# Patient Record
Sex: MALE | Race: WHITE | Employment: FULL TIME | URBAN - METROPOLITAN AREA
[De-identification: names, ages, dates, MRNs, and addresses within clinical notes are randomized per-mention and may not be internally consistent; named-entity substitution may affect disease eponyms.]

---

## 2017-08-31 ENCOUNTER — APPOINTMENT (OUTPATIENT)
Dept: GENERAL RADIOLOGY | Age: 62
End: 2017-08-31
Attending: EMERGENCY MEDICINE
Payer: OTHER GOVERNMENT

## 2017-08-31 ENCOUNTER — APPOINTMENT (OUTPATIENT)
Dept: CT IMAGING | Age: 62
End: 2017-08-31
Attending: EMERGENCY MEDICINE
Payer: OTHER GOVERNMENT

## 2017-08-31 ENCOUNTER — HOSPITAL ENCOUNTER (EMERGENCY)
Age: 62
Discharge: HOME | End: 2017-08-31
Attending: EMERGENCY MEDICINE
Payer: OTHER GOVERNMENT

## 2017-08-31 VITALS
TEMPERATURE: 98.1 F | RESPIRATION RATE: 16 BRPM | HEART RATE: 68 BPM | DIASTOLIC BLOOD PRESSURE: 69 MMHG | OXYGEN SATURATION: 97 % | SYSTOLIC BLOOD PRESSURE: 136 MMHG

## 2017-08-31 DIAGNOSIS — I95.1 ORTHOSTATIC HYPOTENSION: Primary | ICD-10-CM

## 2017-08-31 DIAGNOSIS — R55 SYNCOPE AND COLLAPSE: ICD-10-CM

## 2017-08-31 DIAGNOSIS — D69.1 THROMBOCYTOPATHIA (HCC): ICD-10-CM

## 2017-08-31 DIAGNOSIS — K75.9 HEPATITIS: ICD-10-CM

## 2017-08-31 DIAGNOSIS — F10.10 ALCOHOL ABUSE: ICD-10-CM

## 2017-08-31 LAB
ALBUMIN SERPL-MCNC: 2.8 G/DL (ref 3.4–5)
ALBUMIN/GLOB SERPL: 0.6 {RATIO} (ref 0.8–1.7)
ALP SERPL-CCNC: 132 U/L (ref 45–117)
ALT SERPL-CCNC: 98 U/L (ref 16–61)
AMMONIA PLAS-SCNC: 82 UMOL/L (ref 11–32)
ANION GAP SERPL CALC-SCNC: 10 MMOL/L (ref 3–18)
AST SERPL-CCNC: 272 U/L (ref 15–37)
ATRIAL RATE: 63 BPM
BASOPHILS # BLD: 0.1 K/UL (ref 0–0.1)
BASOPHILS NFR BLD: 2 % (ref 0–2)
BILIRUB SERPL-MCNC: 7.5 MG/DL (ref 0.2–1)
BUN SERPL-MCNC: 8 MG/DL (ref 7–18)
BUN/CREAT SERPL: 10 (ref 12–20)
CALCIUM SERPL-MCNC: 8.4 MG/DL (ref 8.5–10.1)
CALCULATED P AXIS, ECG09: 48 DEGREES
CALCULATED R AXIS, ECG10: -17 DEGREES
CALCULATED T AXIS, ECG11: -7 DEGREES
CHLORIDE SERPL-SCNC: 102 MMOL/L (ref 100–108)
CO2 SERPL-SCNC: 27 MMOL/L (ref 21–32)
CREAT SERPL-MCNC: 0.82 MG/DL (ref 0.6–1.3)
DIAGNOSIS, 93000: NORMAL
DIFFERENTIAL METHOD BLD: ABNORMAL
EOSINOPHIL # BLD: 0.1 K/UL (ref 0–0.4)
EOSINOPHIL NFR BLD: 1 % (ref 0–5)
ERYTHROCYTE [DISTWIDTH] IN BLOOD BY AUTOMATED COUNT: 17.8 % (ref 11.6–14.5)
GLOBULIN SER CALC-MCNC: 4.7 G/DL (ref 2–4)
GLUCOSE SERPL-MCNC: 129 MG/DL (ref 74–99)
HCT VFR BLD AUTO: 33.3 % (ref 36–48)
HGB BLD-MCNC: 12.6 G/DL (ref 13–16)
LYMPHOCYTES # BLD: 1.3 K/UL (ref 0.9–3.6)
LYMPHOCYTES NFR BLD: 22 % (ref 21–52)
MCH RBC QN AUTO: 35 PG (ref 24–34)
MCHC RBC AUTO-ENTMCNC: 37.8 G/DL (ref 31–37)
MCV RBC AUTO: 92.5 FL (ref 74–97)
MONOCYTES # BLD: 0.8 K/UL (ref 0.05–1.2)
MONOCYTES NFR BLD: 13 % (ref 3–10)
NEUTS SEG # BLD: 3.5 K/UL (ref 1.8–8)
NEUTS SEG NFR BLD: 62 % (ref 40–73)
P-R INTERVAL, ECG05: 180 MS
PLATELET # BLD AUTO: 38 K/UL (ref 135–420)
PLATELET COMMENTS,PCOM: ABNORMAL
POTASSIUM SERPL-SCNC: 2.7 MMOL/L (ref 3.5–5.5)
PROT SERPL-MCNC: 7.5 G/DL (ref 6.4–8.2)
Q-T INTERVAL, ECG07: 480 MS
QRS DURATION, ECG06: 108 MS
QTC CALCULATION (BEZET), ECG08: 491 MS
RBC # BLD AUTO: 3.6 M/UL (ref 4.7–5.5)
RBC MORPH BLD: ABNORMAL
RBC MORPH BLD: ABNORMAL
SODIUM SERPL-SCNC: 139 MMOL/L (ref 136–145)
VENTRICULAR RATE, ECG03: 63 BPM
WBC # BLD AUTO: 5.8 K/UL (ref 4.6–13.2)

## 2017-08-31 PROCEDURE — 74011250636 HC RX REV CODE- 250/636: Performed by: EMERGENCY MEDICINE

## 2017-08-31 PROCEDURE — 71010 XR CHEST PORT: CPT

## 2017-08-31 PROCEDURE — 74011250637 HC RX REV CODE- 250/637: Performed by: EMERGENCY MEDICINE

## 2017-08-31 PROCEDURE — 80053 COMPREHEN METABOLIC PANEL: CPT | Performed by: EMERGENCY MEDICINE

## 2017-08-31 PROCEDURE — 93005 ELECTROCARDIOGRAM TRACING: CPT

## 2017-08-31 PROCEDURE — 82140 ASSAY OF AMMONIA: CPT | Performed by: EMERGENCY MEDICINE

## 2017-08-31 PROCEDURE — 85025 COMPLETE CBC W/AUTO DIFF WBC: CPT | Performed by: EMERGENCY MEDICINE

## 2017-08-31 PROCEDURE — 96360 HYDRATION IV INFUSION INIT: CPT

## 2017-08-31 PROCEDURE — 99285 EMERGENCY DEPT VISIT HI MDM: CPT

## 2017-08-31 PROCEDURE — 70450 CT HEAD/BRAIN W/O DYE: CPT

## 2017-08-31 RX ORDER — POTASSIUM CHLORIDE 20 MEQ/1
40 TABLET, EXTENDED RELEASE ORAL
Status: COMPLETED | OUTPATIENT
Start: 2017-08-31 | End: 2017-08-31

## 2017-08-31 RX ADMIN — SODIUM CHLORIDE 1000 ML: 900 INJECTION, SOLUTION INTRAVENOUS at 14:26

## 2017-08-31 RX ADMIN — POTASSIUM CHLORIDE 40 MEQ: 20 TABLET, EXTENDED RELEASE ORAL at 15:51

## 2017-08-31 RX ADMIN — POTASSIUM CHLORIDE 40 MEQ: 20 TABLET, EXTENDED RELEASE ORAL at 13:13

## 2017-08-31 NOTE — ED PROVIDER NOTES
HPI Comments: 11:48 AM Juany Duque is a 65 y.o. male with a h/o HTN, Hepatitis C, and subdural hematoma who presents to the ED via EMS from PCP office c/o dizziness w/ near syncopal episode that ocurred just pta. Patient denies headache shortness of breath abdominal pain or chest pain prior to or after the event. He felt lightheaded and weak. Per the patient's wife, her  has recently had an unsteady gait, loss of appetite, and difficulty sleeping. This is why they were following up with the blood work that was taken the initial physician visit. No other aggravating or alleviating factors. No other associated symptoms. This document was created with voice recognition technology and unrecognized errors may be present. PCP:  Rowdy Johnson MD      The history is provided by the patient. Past Medical History:   Diagnosis Date    Alcohol abuse, unspecified     Colon polyps     Dietary surveillance and counseling     Hepatitis C     Treated    Hypertension     Psoriasis     Special screening for malignant neoplasms, colon     Subdural hematoma ([address] 1/3/2016    from a fall 8/2015    Unspecified disorder of penis        Past Surgical History:   Procedure Laterality Date    COLONOSCOPY N/A 5/23/2016    COLONOSCOPY WITH HOT SNARE POLYPECTOMY performed by Aye Bryson MD at [facility]    HX CATARACT REMOVAL      HX CRANIOTOMY  1/3/2016    subdural hematoma    HX HERNIA REPAIR           Family History:   Problem Relation Age of Onset    Bipolar Disorder Mother     Alcohol abuse Mother     Heart Disease Father     Suicide Brother        Social History     Social History    Marital status:      Spouse name: N/A    Number of children: N/A    Years of education: N/A     Occupational History    Not on file.      Social History Main Topics    Smoking status: Former Smoker     Packs/day: 0.50     Years: 4.00     Quit date: 1/1/2001    Smokeless tobacco: Not on file    Alcohol use No      Comment: 1/2016    Drug use: No    Sexual activity: Not on file     Other Topics Concern    Not on file     Social History Narrative         ALLERGIES: Review of patient's allergies indicates no known allergies. Review of Systems   Constitutional: Positive for appetite change (decrease). Neurological: Positive for dizziness and syncope (near). + unsteady gait   All other systems reviewed and are negative. There were no vitals filed for this visit. Physical Exam   Constitutional: He is oriented to person, place, and time. He appears well-developed. HENT:   Head: Normocephalic and atraumatic. Eyes: EOM are normal. Pupils are equal, round, and reactive to light. Neck: Normal range of motion. Neck supple. Cardiovascular: Normal rate, regular rhythm and normal heart sounds. Exam reveals no friction rub. No murmur heard. Pulmonary/Chest: Effort normal and breath sounds normal. No respiratory distress. He has no wheezes. Abdominal: Soft. He exhibits no distension. There is no tenderness. There is no rebound and no guarding. Musculoskeletal: Normal range of motion. Neurological: He is alert and oriented to person, place, and time. Skin: Skin is warm and dry. Psychiatric: He has a normal mood and affect. His behavior is normal. Thought content normal.        MDM  Number of Diagnoses or Management Options  Diagnosis management comments:   EKG: Sinus is 63, axis normal intervals, QTC at 4  No ST elevation or depression or hypertrophy. 1.  Syncope 55-year-old male with cirrhosis still drinking alcohol and wine last night and shot this morning presents for syncope from the doctor's office initially hypotensive bradycardic suggesting vagal response. I have a history of hypertension but no history of cardiac disease so this low risk of tachydysrhythmia. History of subdural hematoma status a CT of his head as well. 1:38 PM + orthstatics with standing.  Will ad 1 L NS. Ct head neg. cxr napd. Patient with orthostatic hypotension. Fluids. He has been taking lactulose for elevated ammonia as well as the which is a diuretic and having poor by mouth intake per wife. Likely the cause orthostatic hypotension. He is awake and alert. Does have an elevated ammonia but is not altered answering questions appropriately his wife is present I think is okay to follow up outpatient. Javid/ dr Griselda Lira. ED Course       Procedures    Scribe [id number] Antonio Luna acting as a scribe for and in the presence of Remedios Jones MD      August 31, 2017 at 11:57 AM       Provider Attestation:      I personally performed the services described in the documentation, reviewed the documentation, as recorded by the scribe in my presence, and it accurately and completely records my words and actions.  August 31, 2017 at 11:57 AM - Remedios Jones MD

## 2017-08-31 NOTE — DISCHARGE INSTRUCTIONS
Fainting: Care Instructions  Your Care Instructions    When you faint, or pass out, you lose consciousness for a short time. A brief drop in blood flow to the brain often causes it. When you fall or lie down, more blood flows to your brain and you regain consciousness. Emotional stress, pain, or overheating--especially if you have been standing--can make you faint. In these cases, fainting is usually not serious. But fainting can be a sign of a more serious problem. Your doctor may want you to have more tests to rule out other causes. The treatment you need depends on the reason why you fainted. The doctor has checked you carefully, but problems can develop later. If you notice any problems or new symptoms, get medical treatment right away. Follow-up care is a key part of your treatment and safety. Be sure to make and go to all appointments, and call your doctor if you are having problems. It's also a good idea to know your test results and keep a list of the medicines you take. How can you care for yourself at home? · Drink plenty of fluids to prevent dehydration. If you have kidney, heart, or liver disease and have to limit fluids, talk with your doctor before you increase your fluid intake. When should you call for help? Call 911 anytime you think you may need emergency care. For example, call if:  · You have symptoms of a heart problem. These may include:  ¨ Chest pain or pressure. ¨ Severe trouble breathing. ¨ A fast or irregular heartbeat. ¨ Lightheadedness or sudden weakness. ¨ Coughing up pink, foamy mucus. ¨ Passing out. After you call 911, the  may tell you to chew 1 adult-strength or 2 to 4 low-dose aspirin. Wait for an ambulance. Do not try to drive yourself. · You have symptoms of a stroke. These may include:  ¨ Sudden numbness, tingling, weakness, or loss of movement in your face, arm, or leg, especially on only one side of your body. ¨ Sudden vision changes.   ¨ Sudden trouble speaking. ¨ Sudden confusion or trouble understanding simple statements. ¨ Sudden problems with walking or balance. ¨ A sudden, severe headache that is different from past headaches. · You passed out (lost consciousness) again. Watch closely for changes in your health, and be sure to contact your doctor if:  · You do not get better as expected. Where can you learn more? Go to http://guillermo-nancy.info/. Enter G491 in the search box to learn more about \"Fainting: Care Instructions. \"  Current as of: March 20, 2017  Content Version: 11.3  © 7021-7888 [employer]. Care instructions adapted under license by [employer] (which disclaims liability or warranty for this information). If you have questions about a medical condition or this instruction, always ask your healthcare professional. [address] any warranty or liability for your use of this information. Orthostatic Hypotension: Care Instructions  Your Care Instructions  Orthostatic hypotension is a quick drop in blood pressure. It happens when you get up from sitting or lying down. You may feel faint, lightheaded, or dizzy. When a person sits up or stands up, the body changes the way it pumps blood. This can slow the flow of blood to the brain for a very short time. And that can make you feel lightheaded. Many medicines can cause this problem, especially in older people. Lack of fluids (dehydration) or illnesses such as diabetes or heart disease also can cause it. Follow-up care is a key part of your treatment and safety. Be sure to make and go to all appointments, and call your doctor if you are having problems. It's also a good idea to know your test results and keep a list of the medicines you take. How can you care for yourself at home? · Tell your doctor about any problems you have with your medicines.   · If your doctor prescribes medicine to help prevent a low blood pressure problem, take it exactly as prescribed. Call your doctor if you think you are having a problem with your medicine. · Drink plenty of fluids, enough so that your urine is light yellow or clear like water. Choose water and other caffeine-free clear liquids. If you have kidney, heart, or liver disease and have to limit fluids, talk with your doctor before you increase the amount of fluids you drink. · Limit or avoid alcohol and caffeine. · Get up slowly from bed or after sitting for a long time. If you are in bed, roll to your side and swing your legs over the edge of the bed and onto the floor. Push your body up to a sitting position. Wait for a while before you slowly stand up. If you are dizzy or lightheaded, sit or lie down. When should you call for help? Call 911 anytime you think you may need emergency care. For example, call if:  · You passed out (lost consciousness). Watch closely for changes in your health, and be sure to contact your doctor if:  · You do not get better as expected. Where can you learn more? Go to http://guillermo-nancy.info/. Enter H561 in the search box to learn more about \"Orthostatic Hypotension: Care Instructions. \"  Current as of: April 3, 2017  Content Version: 11.3  © 9931-5786 [employer]. Care instructions adapted under license by [employer] (which disclaims liability or warranty for this information). If you have questions about a medical condition or this instruction, always ask your healthcare professional. [place] [address] any warranty or liability for your use of this information.

## 2017-08-31 NOTE — ED TRIAGE NOTES
Patient arrived via EMS from primary care office c/o syncope. Patient states he felt light headed and passed out for a brief moment before regaining consciousness. EMS reports vital stables.

## 2017-08-31 NOTE — ED NOTES
Patient states he drinks roughly a bottle of wine at night and drinks multiple shots of vodka. Patient states he has hx of cirrhosis of the liver.

## 2017-09-12 ENCOUNTER — HOSPITAL ENCOUNTER (OUTPATIENT)
Dept: GENERAL RADIOLOGY | Age: 62
Discharge: HOME | End: 2017-09-12
Payer: OTHER GOVERNMENT

## 2017-09-12 DIAGNOSIS — M25.551 HIP PAIN, RIGHT: ICD-10-CM

## 2017-09-12 PROCEDURE — 73502 X-RAY EXAM HIP UNI 2-3 VIEWS: CPT

## 2017-09-12 PROCEDURE — 72100 X-RAY EXAM L-S SPINE 2/3 VWS: CPT

## 2017-10-10 ENCOUNTER — HOSPITAL ENCOUNTER (OUTPATIENT)
Dept: MRI IMAGING | Age: 62
Discharge: HOME | End: 2017-10-10
Attending: INTERNAL MEDICINE
Payer: OTHER GOVERNMENT

## 2017-10-10 DIAGNOSIS — K70.30 ALCOHOLIC CIRRHOSIS OF LIVER (HCC): ICD-10-CM

## 2017-10-10 DIAGNOSIS — F10.20 ACUTE ALCOHOLISM (HCC): ICD-10-CM

## 2017-10-10 LAB — CREAT UR-MCNC: 0.6 MG/DL (ref 0.6–1.3)

## 2017-10-10 PROCEDURE — 74183 MRI ABD W/O CNTR FLWD CNTR: CPT

## 2017-10-10 PROCEDURE — 74011250636 HC RX REV CODE- 250/636: Performed by: INTERNAL MEDICINE

## 2017-10-10 PROCEDURE — 82565 ASSAY OF CREATININE: CPT

## 2017-10-10 PROCEDURE — A9585 GADOBUTROL INJECTION: HCPCS | Performed by: INTERNAL MEDICINE

## 2017-10-10 RX ADMIN — GADOBUTROL 10 ML: 604.72 INJECTION INTRAVENOUS at 11:26

## 2017-12-12 ENCOUNTER — HOSPITAL ENCOUNTER (OUTPATIENT)
Dept: NUCLEAR MEDICINE | Age: 62
Discharge: HOME | End: 2017-12-12
Attending: FAMILY MEDICINE
Payer: OTHER GOVERNMENT

## 2017-12-12 VITALS — BODY MASS INDEX: 31.32 KG/M2 | WEIGHT: 200 LBS

## 2017-12-12 DIAGNOSIS — K82.8 ENLARGED GALLBLADDER: ICD-10-CM

## 2017-12-12 PROCEDURE — 74011250636 HC RX REV CODE- 250/636

## 2017-12-12 PROCEDURE — 74011000258 HC RX REV CODE- 258

## 2017-12-12 PROCEDURE — 78227 HEPATOBIL SYST IMAGE W/DRUG: CPT

## 2017-12-12 RX ORDER — SODIUM CHLORIDE 9 MG/ML
50 INJECTION, SOLUTION INTRAVENOUS
Status: COMPLETED | OUTPATIENT
Start: 2017-12-12 | End: 2017-12-12

## 2017-12-12 RX ADMIN — SODIUM CHLORIDE 50 ML/HR: 900 INJECTION, SOLUTION INTRAVENOUS at 10:36

## 2017-12-12 RX ADMIN — SINCALIDE 1.8 MCG: 5 INJECTION, POWDER, LYOPHILIZED, FOR SOLUTION INTRAVENOUS at 10:35

## 2017-12-27 PROBLEM — G47.30 SLEEP APNEA IN ADULT: Status: ACTIVE | Noted: 2017-12-27

## 2017-12-27 PROBLEM — B19.20 HEPATITIS C VIRUS INFECTION WITHOUT HEPATIC COMA: Status: ACTIVE | Noted: 2017-12-27

## 2017-12-27 PROBLEM — N40.0 BENIGN PROSTATIC HYPERPLASIA: Status: ACTIVE | Noted: 2017-12-27

## 2017-12-27 PROBLEM — E66.9 OBESITY (BMI 30.0-34.9): Status: ACTIVE | Noted: 2017-12-27

## 2017-12-27 PROBLEM — A74.9 CHLAMYDIA: Status: ACTIVE | Noted: 2017-12-27

## 2017-12-27 PROBLEM — N52.9 ED (ERECTILE DYSFUNCTION) OF ORGANIC ORIGIN: Status: ACTIVE | Noted: 2017-12-27

## 2018-03-01 ENCOUNTER — HOSPITAL ENCOUNTER (OUTPATIENT)
Dept: ULTRASOUND IMAGING | Age: 63
Discharge: HOME | End: 2018-03-01
Attending: INTERNAL MEDICINE
Payer: OTHER GOVERNMENT

## 2018-03-01 DIAGNOSIS — R63.5 WEIGHT GAIN: ICD-10-CM

## 2018-03-01 PROCEDURE — 76705 ECHO EXAM OF ABDOMEN: CPT

## 2018-06-05 ENCOUNTER — OFFICE VISIT (OUTPATIENT)
Dept: CARDIOLOGY CLINIC | Age: 63
End: 2018-06-05

## 2018-06-05 VITALS
SYSTOLIC BLOOD PRESSURE: 138 MMHG | OXYGEN SATURATION: 96 % | HEIGHT: 67 IN | BODY MASS INDEX: 37.51 KG/M2 | WEIGHT: 239 LBS | DIASTOLIC BLOOD PRESSURE: 72 MMHG | HEART RATE: 66 BPM

## 2018-06-05 DIAGNOSIS — I10 HYPERTENSION, UNSPECIFIED TYPE: ICD-10-CM

## 2018-06-05 DIAGNOSIS — R00.1 BRADYCARDIA: ICD-10-CM

## 2018-06-05 DIAGNOSIS — I35.8 AORTIC VALVE SCLEROSIS: Primary | ICD-10-CM

## 2018-06-05 NOTE — PROGRESS NOTES
1. Have you been to the ER, urgent care clinic since your last visit? Hospitalized since your last visit? No    2. Have you seen or consulted any other health care providers outside of the [address] [place] [address] since your last visit? Include any pap smears or colon screening.  No

## 2018-06-05 NOTE — MR AVS SNAPSHOT
[address] [address] [address] [address] [phone] Patient: Jeannette Alcazar MRN: [id number] Visit Information Date & Time Provider Department Dept. Phone Encounter #  
 2018  9:00 AM Cornelia Noyola MD [facility] [id number]  
  
 2019 11:30 AM  
Any with Norma Dallas MD  
[facility] ([address] [place] [address]) Appt Note: EP- 1 year follow up  
  [facility] [address] [address] [address]  
  
   
  [facility] [address] [facility] [address] [facility] Upcoming Health Maintenance Date Due DTaP/Tdap/Td series (1 - Tdap) 3/18/1977 FOBT Q 1 YEAR AGE 50-75 3/18/2006 ZOSTER VACCINE AGE 60> 2016 Influenza Age 5 to Adult 2018 Allergies as of 2018  Review Complete On: 2018 By: Cornelia Noyola MD  
 Not on File Current Immunizations  Never Reviewed Name Date Pneumococcal Polysaccharide (PPSV-23) 2016 12:05 AM  
  
 Not reviewed this visit You Were Diagnosed With   
  
 Codes Comments Hypertension, unspecified type    -  Primary ICD-10-CM: I10 
ICD-9-CM: 401.9 Dizziness     ICD-10-CM: H98 ICD-9-CM: 780.4 Bradycardia     ICD-10-CM: R00.1 ICD-9-CM: 427.89 Vitals BP Pulse Height(growth percentile) Weight(growth percentile) SpO2 BMI  
 138/72 66 5' 7\" (1.702 m) 239 lb (108.4 kg) 96% 37.43 kg/m2 Smoking Status Former Smoker Vitals History BMI and BSA Data Body Mass Index Body Surface Area  
 37.43 kg/m 2 2.26 m 2 Preferred Pharmacy Pharmacy Name Phone [facility] [address] [facility] [phone] Your Updated Medication List  
  
   
This list is accurate as of 18 10:12 AM.  Always use your most recent med list.  
  
  
  
  
 amLODIPine 10 mg tablet Commonly known as:  [place] Take 10 mg by mouth daily. cloNIDine HCl 0.1 mg tablet Commonly known as:  CATAPRES Take 0.1 mg by mouth three (3) times daily. hydrOXYzine HCl 25 mg tablet Commonly known as:  ATARAX Take 25 mg by mouth three (3) times daily as needed for Itching. sildenafil citrate 100 mg tablet Commonly known as:  VIAGRA Take 0.5 Tabs by mouth daily as needed for up to 10 doses. tamsulosin 0.4 mg capsule Commonly known as:  FLOMAX Take 1 Cap by mouth daily (after dinner). Indications: benign prostatic hyperplasia with lower urinary tract sx  
  
 therapeutic multivitamin tablet Commonly known as:  [facility] Take 1 Tab by mouth daily. Patient may take any OTC MVI  
  
 ustekinaumab 90 mg/mL injection Generic drug:  Ustekinumab  
by SubCUTAneous route once. We Performed the Following AMB POC EKG ROUTINE W/ 12 LEADS, INTER & REP [39380 CPT(R)] Introducing [facility]! Dear Monica Fortune: 
Thank you for requesting a [employer] account. Our records indicate that you already have an active [employer] account. You can access your account anytime at [email or web address] Did you know that you can access your hospital and ER discharge instructions at any time in [employer]? You can also review all of your test results from your hospital stay or ER visit. Additional Information If you have questions, please visit the Frequently Asked Questions section of the [employer] website at [email or web address] Remember, [employer] is NOT to be used for urgent needs. For medical emergencies, dial 911. Now available from your iPhone and Android! Please provide this summary of care documentation to your next provider. Your primary care clinician is listed as Fred Chawla. If you have any questions after today's visit, please call [phone].

## 2018-06-05 NOTE — PROGRESS NOTES
HISTORY OF PRESENT ILLNESS  Chloe Wesley is a 59 y.o. male. HPI  He is referred to my office for evaluation of abnormal Holter monitor and heart murmur. He has been essentially asymptomatic. He denies chest pain, dyspnea, orthopnea, PND. He denies palpitation, dizziness or syncope. He denies any symptoms of claudication, TIA or amaurosis fugax. He was told that he had a heart murmur and apparently had an echocardiogram for which the report is not available at this time. He had a Holter monitor which demonstrated baseline sinus rhythm with occasional PAC's and PVC 's and bradycardia, but no significant bradycardia. His heart rate apparently ranged from 49 bpm to 94 bpm. There was no evidence of atrial fibrillation. He  Is a nonsmoker. He has no history of diabetes mellitus. He indicates that at time his sugar tends to be too low. He has been told his cholesterol profile has been good. He denies family history of coronary artery disease. We finally obtained a copy of his echocardiogram that was done at Dr. Cornelius Santos office which reportedly demonstrated a normal echocardiographic examination. The LV function was reportedly normal and there was no evidence of significant valvular pathology. His ejection fraction was in the 60-65% range with normal diastolic filling pattern. The left atrial dimension was normal. There was no evidence of pulmonary hypertension. Review of Systems   Constitutional: Negative for malaise/fatigue and weight loss. HENT: Negative for hearing loss. Eyes: Negative for blurred vision and double vision. Respiratory: Negative for shortness of breath. Cardiovascular: Negative for chest pain, palpitations, orthopnea, claudication, leg swelling and PND. Gastrointestinal: Negative for blood in stool, heartburn and melena. Genitourinary: Negative for dysuria, frequency, hematuria and urgency. Musculoskeletal: Negative for back pain and joint pain.    Skin: Negative for itching and rash. Neurological: Positive for dizziness. Negative for loss of consciousness and weakness. Psychiatric/Behavioral: Negative for depression and memory loss. Physical Exam   Constitutional: He is oriented to person, place, and time. He appears well-developed and well-nourished. HENT:   Head: Normocephalic and atraumatic. Eyes: Conjunctivae are normal. Pupils are equal, round, and reactive to light. Neck: Normal range of motion. Neck supple. No JVD present. Cardiovascular: Normal rate, regular rhythm, S1 normal and S2 normal.   No extrasystoles are present. PMI is not displaced. Exam reveals no gallop and no friction rub. Murmur heard. Harsh early systolic murmur is present with a grade of 2/6  at the upper right sternal border radiating to the neck  Pulses:       Carotid pulses are 3+ on the right side with bruit, and 3+ on the left side with bruit. Pulmonary/Chest: Effort normal. He has no rales. Abdominal: Soft. There is no tenderness. Musculoskeletal: He exhibits no edema. Neurological: He is alert and oriented to person, place, and time. No cranial nerve deficit. Skin: Skin is warm and dry. Psychiatric: He has a normal mood and affect. His behavior is normal.     Visit Vitals    /72    Pulse 66    Ht 5' 7\" (1.702 m)    Wt 108.4 kg (239 lb)    SpO2 96%    BMI 37.43 kg/m2       Past Medical History:   Diagnosis Date    Alcohol abuse, unspecified     Colon polyps     Dietary surveillance and counseling     Hepatitis C     Treated    Hypertension     Psoriasis     Special screening for malignant neoplasms, colon     [facility]2016    from a fall 8/2015    Unspecified disorder of penis        Social History     Social History    Marital status:      Spouse name: N/A    Number of children: N/A    Years of education: N/A     Occupational History    Not on file.      Social History Main Topics    Smoking status: Former Smoker     Packs/day: 0.50     Years: 4.00     Quit date: 1/1/2001    Smokeless tobacco: Never Used    Alcohol use No      Comment: 1/2016    Drug use: No    Sexual activity: Not on file     Other Topics Concern    Not on file     Social History Narrative       Family History   Problem Relation Age of Onset    Bipolar Disorder Mother     Alcohol abuse Mother     Heart Disease Father     Suicide Brother        Past Surgical History:   Procedure Laterality Date    COLONOSCOPY N/A 5/23/2016    COLONOSCOPY WITH HOT SNARE POLYPECTOMY performed by Olena Wu MD at [address] HX CATARACT REMOVAL      HX CRANIOTOMY  1/3/2016    subdural hematoma    HX HERNIA REPAIR         Current Outpatient Prescriptions   Medication Sig Dispense Refill    tamsulosin (FLOMAX) 0.4 mg capsule Take 1 Cap by mouth daily (after dinner). Indications: benign prostatic hyperplasia with lower urinary tract sx 90 Cap 3    sildenafil citrate (VIAGRA) 100 mg tablet Take 0.5 Tabs by mouth daily as needed for up to 10 doses. 18 Tab 3    Ustekinumab (USTEKINAUMAB) 90 mg/mL injection by SubCUTAneous route once.  therapeutic multivitamin (THERAGRAN) tablet Take 1 Tab by mouth daily. Patient may take any OTC MVI 30 Tab 0    amLODIPine (NORVASC) 10 mg tablet Take 10 mg by mouth daily.  cloNIDine HCl (CATAPRES) 0.1 mg tablet Take 0.1 mg by mouth three (3) times daily.  hydrOXYzine (ATARAX) 25 mg tablet Take 25 mg by mouth three (3) times daily as needed for Itching. EKG: normal EKG, normal sinus rhythm, unchanged from previous tracings. ASSESSMENT and PLAN  Encounter Diagnoses   Name Primary?  Aortic valve sclerosis,vs. bicuspid aortic valve Yes    Hypertension, unspecified type     Bradycardia    This patient has had mild bradycardia on Holter monitor with occasional PVC's and PAC's, not enough to warrant further diagnostic workup or treatment.  He does have murmur of what appears to be bicuspid aortic valve with transition to both carotid arteries or sclerotic aortic valve, but hemodynamically insignificant at this time. He has had no symptoms to indicate angina or cardiac decompensation. His blood pressure has been under control. At this point, I would not pursue further cardiac diagnostic workup but I would continue to follow his aortic valve in the future. If there appears to be any significant change in the auscultatory findings in the future, I would then repeat the echocardiogram at that point. He does have a transmitted murmur to both carotid arteries which makes it difficult to distinguish from the carotid bruit and I would consider noninvasive carotid vascular studies sometime in the future to clarify.

## 2018-07-17 ENCOUNTER — HOSPITAL ENCOUNTER (OUTPATIENT)
Dept: ULTRASOUND IMAGING | Age: 63
Discharge: HOME | End: 2018-07-17
Attending: PHYSICIAN ASSISTANT
Payer: OTHER GOVERNMENT

## 2018-07-17 ENCOUNTER — HOSPITAL ENCOUNTER (OUTPATIENT)
Dept: MAMMOGRAPHY | Age: 63
Discharge: HOME | End: 2018-07-17
Attending: PHYSICIAN ASSISTANT
Payer: OTHER GOVERNMENT

## 2018-07-17 DIAGNOSIS — N64.4 BREAST PAIN, RIGHT: ICD-10-CM

## 2018-07-17 PROCEDURE — 77066 DX MAMMO INCL CAD BI: CPT

## 2018-10-05 ENCOUNTER — HOSPITAL ENCOUNTER (OUTPATIENT)
Dept: ULTRASOUND IMAGING | Age: 63
Discharge: HOME | End: 2018-10-05
Attending: INTERNAL MEDICINE
Payer: OTHER GOVERNMENT

## 2018-10-05 DIAGNOSIS — K70.30 ALCOHOLIC CIRRHOSIS OF LIVER (HCC): ICD-10-CM

## 2018-10-05 PROCEDURE — 76705 ECHO EXAM OF ABDOMEN: CPT

## 2019-01-01 ENCOUNTER — APPOINTMENT (OUTPATIENT)
Dept: GENERAL RADIOLOGY | Age: 64
DRG: 853 | End: 2019-01-01
Attending: INTERNAL MEDICINE
Payer: OTHER GOVERNMENT

## 2019-01-01 ENCOUNTER — ANESTHESIA (OUTPATIENT)
Dept: ENDOSCOPY | Age: 64
End: 2019-01-01
Payer: OTHER GOVERNMENT

## 2019-01-01 ENCOUNTER — ANESTHESIA EVENT (OUTPATIENT)
Dept: MEDSURG UNIT | Age: 64
DRG: 853 | End: 2019-01-01
Payer: OTHER GOVERNMENT

## 2019-01-01 ENCOUNTER — APPOINTMENT (OUTPATIENT)
Dept: CT IMAGING | Age: 64
DRG: 853 | End: 2019-01-01
Attending: EMERGENCY MEDICINE
Payer: OTHER GOVERNMENT

## 2019-01-01 ENCOUNTER — TELEPHONE (OUTPATIENT)
Dept: CARDIOLOGY CLINIC | Age: 64
End: 2019-01-01

## 2019-01-01 ENCOUNTER — HOSPITAL ENCOUNTER (OUTPATIENT)
Dept: GENERAL RADIOLOGY | Age: 64
Discharge: HOME | End: 2019-11-04
Payer: OTHER GOVERNMENT

## 2019-01-01 ENCOUNTER — HOSPITAL ENCOUNTER (OUTPATIENT)
Dept: ULTRASOUND IMAGING | Age: 64
Discharge: HOME | End: 2019-10-24
Attending: INTERNAL MEDICINE
Payer: OTHER GOVERNMENT

## 2019-01-01 ENCOUNTER — APPOINTMENT (OUTPATIENT)
Dept: NON INVASIVE DIAGNOSTICS | Age: 64
DRG: 853 | End: 2019-01-01
Attending: INTERNAL MEDICINE
Payer: OTHER GOVERNMENT

## 2019-01-01 ENCOUNTER — APPOINTMENT (OUTPATIENT)
Dept: GENERAL RADIOLOGY | Age: 64
DRG: 853 | End: 2019-01-01
Attending: STUDENT IN AN ORGANIZED HEALTH CARE EDUCATION/TRAINING PROGRAM
Payer: OTHER GOVERNMENT

## 2019-01-01 ENCOUNTER — APPOINTMENT (OUTPATIENT)
Dept: GENERAL RADIOLOGY | Age: 64
DRG: 853 | End: 2019-01-01
Attending: PHYSICIAN ASSISTANT
Payer: OTHER GOVERNMENT

## 2019-01-01 ENCOUNTER — ANESTHESIA (OUTPATIENT)
Dept: MEDSURG UNIT | Age: 64
DRG: 853 | End: 2019-01-01
Payer: OTHER GOVERNMENT

## 2019-01-01 ENCOUNTER — HOSPITAL ENCOUNTER (OUTPATIENT)
Dept: ULTRASOUND IMAGING | Age: 64
Discharge: HOME | End: 2019-04-02
Attending: INTERNAL MEDICINE
Payer: OTHER GOVERNMENT

## 2019-01-01 ENCOUNTER — HOSPITAL ENCOUNTER (OUTPATIENT)
Age: 64
Setting detail: OUTPATIENT SURGERY
Discharge: HOME | End: 2019-05-30
Attending: INTERNAL MEDICINE | Admitting: INTERNAL MEDICINE
Payer: OTHER GOVERNMENT

## 2019-01-01 ENCOUNTER — OFFICE VISIT (OUTPATIENT)
Dept: CARDIOLOGY CLINIC | Age: 64
End: 2019-01-01

## 2019-01-01 ENCOUNTER — ANESTHESIA EVENT (OUTPATIENT)
Dept: ENDOSCOPY | Age: 64
End: 2019-01-01
Payer: OTHER GOVERNMENT

## 2019-01-01 ENCOUNTER — APPOINTMENT (OUTPATIENT)
Dept: GENERAL RADIOLOGY | Age: 64
DRG: 853 | End: 2019-01-01
Attending: EMERGENCY MEDICINE
Payer: OTHER GOVERNMENT

## 2019-01-01 ENCOUNTER — HOSPITAL ENCOUNTER (OUTPATIENT)
Dept: VASCULAR SURGERY | Age: 64
Discharge: HOME | End: 2019-02-19
Attending: INTERNAL MEDICINE
Payer: OTHER GOVERNMENT

## 2019-01-01 ENCOUNTER — HOSPITAL ENCOUNTER (INPATIENT)
Age: 64
LOS: 2 days | DRG: 853 | End: 2019-11-10
Attending: EMERGENCY MEDICINE | Admitting: INTERNAL MEDICINE
Payer: OTHER GOVERNMENT

## 2019-01-01 VITALS
HEIGHT: 68 IN | WEIGHT: 240 LBS | DIASTOLIC BLOOD PRESSURE: 80 MMHG | SYSTOLIC BLOOD PRESSURE: 130 MMHG | HEART RATE: 69 BPM | OXYGEN SATURATION: 98 % | BODY MASS INDEX: 36.37 KG/M2

## 2019-01-01 VITALS
HEIGHT: 68 IN | HEART RATE: 69 BPM | DIASTOLIC BLOOD PRESSURE: 53 MMHG | RESPIRATION RATE: 15 BRPM | OXYGEN SATURATION: 100 % | BODY MASS INDEX: 34.86 KG/M2 | SYSTOLIC BLOOD PRESSURE: 134 MMHG | TEMPERATURE: 98.2 F | WEIGHT: 230 LBS

## 2019-01-01 VITALS
WEIGHT: 291.01 LBS | HEIGHT: 68 IN | DIASTOLIC BLOOD PRESSURE: 16 MMHG | OXYGEN SATURATION: 32 % | SYSTOLIC BLOOD PRESSURE: 38 MMHG | TEMPERATURE: 98.2 F | BODY MASS INDEX: 44.1 KG/M2

## 2019-01-01 VITALS
HEART RATE: 63 BPM | WEIGHT: 230 LBS | BODY MASS INDEX: 34.86 KG/M2 | HEIGHT: 68 IN | OXYGEN SATURATION: 96 % | DIASTOLIC BLOOD PRESSURE: 72 MMHG | SYSTOLIC BLOOD PRESSURE: 108 MMHG

## 2019-01-01 DIAGNOSIS — Z86.010 HISTORY OF COLON POLYPS: ICD-10-CM

## 2019-01-01 DIAGNOSIS — K70.30 ALCOHOLIC CIRRHOSIS (HCC): ICD-10-CM

## 2019-01-01 DIAGNOSIS — R09.89 BRUIT: ICD-10-CM

## 2019-01-01 DIAGNOSIS — I35.8 AORTIC VALVE SCLEROSIS: Primary | ICD-10-CM

## 2019-01-01 DIAGNOSIS — K72.00 SEPSIS WITH ACUTE LIVER FAILURE WITHOUT SEPTIC SHOCK, DUE TO UNSPECIFIED ORGANISM, UNSPECIFIED WHETHER HEPATIC COMA PRESENT (HCC): ICD-10-CM

## 2019-01-01 DIAGNOSIS — K70.30: ICD-10-CM

## 2019-01-01 DIAGNOSIS — K70.30 CIRRHOSIS, ALCOHOLIC (HCC): ICD-10-CM

## 2019-01-01 DIAGNOSIS — D12.4 BENIGN NEOPLASM OF DESCENDING COLON: ICD-10-CM

## 2019-01-01 DIAGNOSIS — K70.31 ASCITES DUE TO ALCOHOLIC CIRRHOSIS (HCC): ICD-10-CM

## 2019-01-01 DIAGNOSIS — R00.1 BRADYCARDIA: ICD-10-CM

## 2019-01-01 DIAGNOSIS — I85.00 ESOPHAGEAL VARICES WITHOUT BLEEDING (HCC): ICD-10-CM

## 2019-01-01 DIAGNOSIS — I10 HYPERTENSION, UNSPECIFIED TYPE: ICD-10-CM

## 2019-01-01 DIAGNOSIS — A41.9 SEPSIS WITH ACUTE LIVER FAILURE WITHOUT SEPTIC SHOCK, DUE TO UNSPECIFIED ORGANISM, UNSPECIFIED WHETHER HEPATIC COMA PRESENT (HCC): ICD-10-CM

## 2019-01-01 DIAGNOSIS — F10.20 ACUTE ALCOHOLISM (HCC): ICD-10-CM

## 2019-01-01 DIAGNOSIS — R65.20 SEPSIS WITH ACUTE LIVER FAILURE WITHOUT SEPTIC SHOCK, DUE TO UNSPECIFIED ORGANISM, UNSPECIFIED WHETHER HEPATIC COMA PRESENT (HCC): ICD-10-CM

## 2019-01-01 DIAGNOSIS — N30.00 ACUTE CYSTITIS WITHOUT HEMATURIA: Primary | ICD-10-CM

## 2019-01-01 DIAGNOSIS — F10.20 ALCOHOL DEPENDENCE (HCC): ICD-10-CM

## 2019-01-01 LAB
ALBUMIN SERPL-MCNC: 0.9 G/DL (ref 3.4–5)
ALBUMIN SERPL-MCNC: 1.3 G/DL (ref 3.4–5)
ALBUMIN SERPL-MCNC: 1.6 G/DL (ref 3.4–5)
ALBUMIN SERPL-MCNC: 2 G/DL (ref 3.4–5)
ALBUMIN SERPL-MCNC: 2.1 G/DL (ref 3.4–5)
ALBUMIN/GLOB SERPL: 0.4 {RATIO} (ref 0.8–1.7)
ALBUMIN/GLOB SERPL: 0.4 {RATIO} (ref 0.8–1.7)
ALBUMIN/GLOB SERPL: 1.3 {RATIO} (ref 0.8–1.7)
ALBUMIN/GLOB SERPL: 1.3 {RATIO} (ref 0.8–1.7)
ALBUMIN/GLOB SERPL: 1.5 {RATIO} (ref 0.8–1.7)
ALP SERPL-CCNC: 114 U/L (ref 45–117)
ALP SERPL-CCNC: 28 U/L (ref 45–117)
ALP SERPL-CCNC: 41 U/L (ref 45–117)
ALP SERPL-CCNC: 52 U/L (ref 45–117)
ALP SERPL-CCNC: 59 U/L (ref 45–117)
ALT SERPL-CCNC: 118 U/L (ref 16–61)
ALT SERPL-CCNC: 118 U/L (ref 16–61)
ALT SERPL-CCNC: 123 U/L (ref 16–61)
ALT SERPL-CCNC: 47 U/L (ref 16–61)
ALT SERPL-CCNC: 68 U/L (ref 16–61)
AMMONIA PLAS-SCNC: 37 UMOL/L (ref 11–32)
AMORPH CRY URNS QL MICRO: ABNORMAL
ANION GAP SERPL CALC-SCNC: 10 MMOL/L (ref 3–18)
ANION GAP SERPL CALC-SCNC: 12 MMOL/L (ref 3–18)
ANION GAP SERPL CALC-SCNC: 24 MMOL/L (ref 3–18)
ANION GAP SERPL CALC-SCNC: 25 MMOL/L (ref 3–18)
ANION GAP SERPL CALC-SCNC: 26 MMOL/L (ref 3–18)
ANION GAP SERPL CALC-SCNC: 29 MMOL/L (ref 3–18)
ANION GAP SERPL CALC-SCNC: 31 MMOL/L (ref 3–18)
APPEARANCE UR: ABNORMAL
APTT PPP: 114.2 SEC (ref 23–36.4)
APTT PPP: 64.8 SEC (ref 23–36.4)
APTT PPP: 66.5 SEC (ref 23–36.4)
APTT PPP: 87.5 SEC (ref 23–36.4)
ARTERIAL PATENCY WRIST A: ABNORMAL
ARTERIAL PATENCY WRIST A: YES
AST SERPL-CCNC: 101 U/L (ref 10–38)
AST SERPL-CCNC: 103 U/L (ref 10–38)
AST SERPL-CCNC: 534 U/L (ref 10–38)
AST SERPL-CCNC: 549 U/L (ref 10–38)
AST SERPL-CCNC: 551 U/L (ref 10–38)
ATRIAL RATE: 113 BPM
ATRIAL RATE: 120 BPM
BACTERIA URNS QL MICRO: ABNORMAL /HPF
BASE DEFICIT BLD-SCNC: 17 MMOL/L
BASE DEFICIT BLD-SCNC: 19 MMOL/L
BASE DEFICIT BLD-SCNC: 21 MMOL/L
BASE DEFICIT BLD-SCNC: 22 MMOL/L
BASE DEFICIT BLD-SCNC: 22 MMOL/L
BASE DEFICIT BLD-SCNC: 25 MMOL/L
BASE DEFICIT BLD-SCNC: 25 MMOL/L
BASE DEFICIT BLD-SCNC: 27 MMOL/L
BASOPHILS # BLD: 0 K/UL (ref 0–0.06)
BASOPHILS # BLD: 0 K/UL (ref 0–0.1)
BASOPHILS NFR BLD: 0 % (ref 0–2)
BASOPHILS NFR BLD: 0 % (ref 0–3)
BDY SITE: ABNORMAL
BILIRUB DIRECT SERPL-MCNC: 1.4 MG/DL (ref 0–0.2)
BILIRUB SERPL-MCNC: 2.1 MG/DL (ref 0.2–1)
BILIRUB SERPL-MCNC: 3.4 MG/DL (ref 0.2–1)
BILIRUB SERPL-MCNC: 3.5 MG/DL (ref 0.2–1)
BILIRUB SERPL-MCNC: 5.4 MG/DL (ref 0.2–1)
BILIRUB SERPL-MCNC: 8.6 MG/DL (ref 0.2–1)
BILIRUB UR QL: ABNORMAL
BLD PROD TYP BPU: NORMAL
BODY TEMPERATURE: 35.1
BODY TEMPERATURE: 36.7
BPU ID: NORMAL
BUN SERPL-MCNC: 11 MG/DL (ref 7–18)
BUN SERPL-MCNC: 21 MG/DL (ref 7–18)
BUN SERPL-MCNC: 26 MG/DL (ref 7–18)
BUN SERPL-MCNC: 27 MG/DL (ref 7–18)
BUN SERPL-MCNC: 28 MG/DL (ref 7–18)
BUN/CREAT SERPL: 10 (ref 12–20)
BUN/CREAT SERPL: 11 (ref 12–20)
BUN/CREAT SERPL: 11 (ref 12–20)
BUN/CREAT SERPL: 13 (ref 12–20)
BUN/CREAT SERPL: 14 (ref 12–20)
BUN/CREAT SERPL: 14 (ref 12–20)
BUN/CREAT SERPL: 9 (ref 12–20)
CA-I SERPL-SCNC: 0.32 MMOL/L (ref 1.12–1.32)
CA-I SERPL-SCNC: 0.87 MMOL/L (ref 1.12–1.32)
CA-I SERPL-SCNC: 0.91 MMOL/L (ref 1.12–1.32)
CA-I SERPL-SCNC: 0.92 MMOL/L (ref 1.12–1.32)
CA-I SERPL-SCNC: 0.93 MMOL/L (ref 1.12–1.32)
CALCIUM SERPL-MCNC: 6.6 MG/DL (ref 8.5–10.1)
CALCIUM SERPL-MCNC: 7.1 MG/DL (ref 8.5–10.1)
CALCIUM SERPL-MCNC: 8 MG/DL (ref 8.5–10.1)
CALCIUM SERPL-MCNC: 8.2 MG/DL (ref 8.5–10.1)
CALCIUM SERPL-MCNC: 8.4 MG/DL (ref 8.5–10.1)
CALCIUM SERPL-MCNC: 8.6 MG/DL (ref 8.5–10.1)
CALCIUM SERPL-MCNC: 8.6 MG/DL (ref 8.5–10.1)
CALCULATED P AXIS, ECG09: 25 DEGREES
CALCULATED P AXIS, ECG09: 31 DEGREES
CALCULATED R AXIS, ECG10: -12 DEGREES
CALCULATED R AXIS, ECG10: -6 DEGREES
CALCULATED T AXIS, ECG11: -10 DEGREES
CALCULATED T AXIS, ECG11: -5 DEGREES
CALLED TO:,BCALL1: NORMAL
CALLED TO:,BCALL2: NORMAL
CHLORIDE SERPL-SCNC: 105 MMOL/L (ref 100–111)
CHLORIDE SERPL-SCNC: 109 MMOL/L (ref 100–111)
CHLORIDE SERPL-SCNC: 111 MMOL/L (ref 100–111)
CHLORIDE SERPL-SCNC: 111 MMOL/L (ref 100–111)
CHLORIDE SERPL-SCNC: 112 MMOL/L (ref 100–111)
CHLORIDE SERPL-SCNC: 113 MMOL/L (ref 100–111)
CHLORIDE SERPL-SCNC: 114 MMOL/L (ref 100–111)
CK MB CFR SERPL CALC: 3.5 % (ref 0–4)
CK MB CFR SERPL CALC: 3.7 % (ref 0–4)
CK MB CFR SERPL CALC: 3.8 % (ref 0–4)
CK MB SERPL-MCNC: 29.5 NG/ML (ref 5–25)
CK MB SERPL-MCNC: 30.3 NG/ML (ref 5–25)
CK MB SERPL-MCNC: 45.3 NG/ML (ref 5–25)
CK SERPL-CCNC: 1305 U/L (ref 39–308)
CK SERPL-CCNC: 789 U/L (ref 39–308)
CK SERPL-CCNC: 808 U/L (ref 39–308)
CO2 SERPL-SCNC: 11 MMOL/L (ref 21–32)
CO2 SERPL-SCNC: 11 MMOL/L (ref 21–32)
CO2 SERPL-SCNC: 14 MMOL/L (ref 21–32)
CO2 SERPL-SCNC: 20 MMOL/L (ref 21–32)
CO2 SERPL-SCNC: 23 MMOL/L (ref 21–32)
CO2 SERPL-SCNC: 8 MMOL/L (ref 21–32)
CO2 SERPL-SCNC: 9 MMOL/L (ref 21–32)
COLOR UR: ABNORMAL
CREAT SERPL-MCNC: 0.78 MG/DL (ref 0.6–1.3)
CREAT SERPL-MCNC: 1.58 MG/DL (ref 0.6–1.3)
CREAT SERPL-MCNC: 1.96 MG/DL (ref 0.6–1.3)
CREAT SERPL-MCNC: 2.45 MG/DL (ref 0.6–1.3)
CREAT SERPL-MCNC: 2.46 MG/DL (ref 0.6–1.3)
CREAT SERPL-MCNC: 2.66 MG/DL (ref 0.6–1.3)
CREAT SERPL-MCNC: 2.85 MG/DL (ref 0.6–1.3)
DIAGNOSIS, 93000: NORMAL
DIAGNOSIS, 93000: NORMAL
DIFFERENTIAL METHOD BLD: ABNORMAL
EOSINOPHIL # BLD: 0 K/UL (ref 0–0.4)
EOSINOPHIL # BLD: 0.1 K/UL (ref 0–0.4)
EOSINOPHIL NFR BLD: 0 % (ref 0–5)
EOSINOPHIL NFR BLD: 1 % (ref 0–5)
EPITH CASTS URNS QL MICRO: ABNORMAL /LPF (ref 0–5)
ERYTHROCYTE [DISTWIDTH] IN BLOOD BY AUTOMATED COUNT: 14.7 % (ref 11.6–14.5)
ERYTHROCYTE [DISTWIDTH] IN BLOOD BY AUTOMATED COUNT: 15.2 % (ref 11.6–14.5)
ERYTHROCYTE [DISTWIDTH] IN BLOOD BY AUTOMATED COUNT: 15.2 % (ref 11.6–14.5)
ERYTHROCYTE [DISTWIDTH] IN BLOOD BY AUTOMATED COUNT: 15.4 % (ref 11.6–14.5)
ERYTHROCYTE [DISTWIDTH] IN BLOOD BY AUTOMATED COUNT: 15.6 % (ref 11.6–14.5)
ERYTHROCYTE [DISTWIDTH] IN BLOOD BY AUTOMATED COUNT: 17.9 % (ref 11.6–14.5)
FIBRINOGEN PPP-MCNC: 141 MG/DL (ref 210–451)
FIBRINOGEN PPP-MCNC: 77 MG/DL (ref 210–451)
GAS FLOW.O2 O2 DELIVERY SYS: ABNORMAL L/MIN
GAS FLOW.O2 SETTING OXYMISER: 15 L/M
GAS FLOW.O2 SETTING OXYMISER: 15 L/M
GAS FLOW.O2 SETTING OXYMISER: 16 BPM
GLOBULIN SER CALC-MCNC: 1 G/DL (ref 2–4)
GLOBULIN SER CALC-MCNC: 1.4 G/DL (ref 2–4)
GLOBULIN SER CALC-MCNC: 1.5 G/DL (ref 2–4)
GLOBULIN SER CALC-MCNC: 2.3 G/DL (ref 2–4)
GLOBULIN SER CALC-MCNC: 4.1 G/DL (ref 2–4)
GLUCOSE BLD STRIP.AUTO-MCNC: 104 MG/DL (ref 70–110)
GLUCOSE BLD STRIP.AUTO-MCNC: 74 MG/DL (ref 70–110)
GLUCOSE BLD STRIP.AUTO-MCNC: 82 MG/DL (ref 70–110)
GLUCOSE SERPL-MCNC: 101 MG/DL (ref 74–99)
GLUCOSE SERPL-MCNC: 129 MG/DL (ref 74–99)
GLUCOSE SERPL-MCNC: 133 MG/DL (ref 74–99)
GLUCOSE SERPL-MCNC: 137 MG/DL (ref 74–99)
GLUCOSE SERPL-MCNC: 223 MG/DL (ref 74–99)
GLUCOSE SERPL-MCNC: 86 MG/DL (ref 74–99)
GLUCOSE SERPL-MCNC: 86 MG/DL (ref 74–99)
GLUCOSE UR STRIP.AUTO-MCNC: NEGATIVE MG/DL
HCO3 BLD-SCNC: 11.4 MMOL/L (ref 22–26)
HCO3 BLD-SCNC: 11.7 MMOL/L (ref 22–26)
HCO3 BLD-SCNC: 5.8 MMOL/L (ref 22–26)
HCO3 BLD-SCNC: 6.9 MMOL/L (ref 22–26)
HCO3 BLD-SCNC: 7 MMOL/L (ref 22–26)
HCO3 BLD-SCNC: 8.1 MMOL/L (ref 22–26)
HCO3 BLD-SCNC: 9.1 MMOL/L (ref 22–26)
HCO3 BLD-SCNC: 9.5 MMOL/L (ref 22–26)
HCT VFR BLD AUTO: 14.8 % (ref 36–48)
HCT VFR BLD AUTO: 17.8 % (ref 36–48)
HCT VFR BLD AUTO: 18.8 % (ref 36–48)
HCT VFR BLD AUTO: 19.2 % (ref 36–48)
HCT VFR BLD AUTO: 21.4 % (ref 36–48)
HCT VFR BLD AUTO: 24.6 % (ref 36–48)
HCT VFR BLD AUTO: 28.5 % (ref 36–48)
HCT VFR BLD AUTO: 32.7 % (ref 36–48)
HCT VFR BLD AUTO: 5 % (ref 36–48)
HGB BLD-MCNC: 1.7 G/DL (ref 13–16)
HGB BLD-MCNC: 10.6 G/DL (ref 13–16)
HGB BLD-MCNC: 5 G/DL (ref 13–16)
HGB BLD-MCNC: 6 G/DL (ref 13–16)
HGB BLD-MCNC: 6.1 G/DL (ref 13–16)
HGB BLD-MCNC: 6.1 G/DL (ref 13–16)
HGB BLD-MCNC: 7.1 G/DL (ref 13–16)
HGB BLD-MCNC: 8.3 G/DL (ref 13–16)
HGB BLD-MCNC: 9.1 G/DL (ref 13–16)
HGB UR QL STRIP: ABNORMAL
HYALINE CASTS URNS QL MICRO: ABNORMAL /LPF (ref 0–2)
INR PPP: 1.9 (ref 0.8–1.2)
INR PPP: 2.7 (ref 0.8–1.2)
INR PPP: 3.3 (ref 0.8–1.2)
INR PPP: 3.3 (ref 0.8–1.2)
INR PPP: 4.6 (ref 0.8–1.2)
INR PPP: 6.7 (ref 0.8–1.2)
KETONES UR QL STRIP.AUTO: NEGATIVE MG/DL
LACTATE BLD-SCNC: 5.46 MMOL/L (ref 0.4–2)
LACTATE BLD-SCNC: 5.91 MMOL/L (ref 0.4–2)
LACTATE SERPL-SCNC: 19.3 MMOL/L (ref 0.4–2)
LACTATE SERPL-SCNC: 20.4 MMOL/L (ref 0.4–2)
LACTATE SERPL-SCNC: 24.2 MMOL/L (ref 0.4–2)
LACTATE SERPL-SCNC: 25 MMOL/L (ref 0.4–2)
LEFT CCA DIST DIAS: 12.8 CM/S
LEFT CCA DIST SYS: 62 CM/S
LEFT CCA MID DIAS: 12.81 CM/S
LEFT CCA MID SYS: 68.46 CM/S
LEFT CCA PROX DIAS: 16.7 CM/S
LEFT CCA PROX SYS: 67.2 CM/S
LEFT ECA DIAS: 9.66 CM/S
LEFT ECA SYS: 118.1 CM/S
LEFT ICA DIST DIAS: 16 CM/S
LEFT ICA DIST SYS: 71.3 CM/S
LEFT ICA MID DIAS: 13.1 CM/S
LEFT ICA MID SYS: 62.5 CM/S
LEFT ICA PROX DIAS: 9.8 CM/S
LEFT ICA PROX SYS: 52.7 CM/S
LEFT ICA/CCA SYS: 1.15
LEFT SUBCLAVIAN SYS: 88.9 CM/S
LEFT VERTEBRAL DIAS: 7.97 CM/S
LEFT VERTEBRAL SYS: 38.2 CM/S
LEUKOCYTE ESTERASE UR QL STRIP.AUTO: ABNORMAL
LIPASE SERPL-CCNC: 201 U/L (ref 73–393)
LYMPHOCYTES # BLD: 0.2 K/UL (ref 0.8–3.5)
LYMPHOCYTES # BLD: 0.4 K/UL (ref 0.9–3.6)
LYMPHOCYTES # BLD: 1.3 K/UL (ref 0.8–3.5)
LYMPHOCYTES # BLD: 2.6 K/UL (ref 0.8–3.5)
LYMPHOCYTES NFR BLD: 1 % (ref 20–51)
LYMPHOCYTES NFR BLD: 11 % (ref 20–51)
LYMPHOCYTES NFR BLD: 11 % (ref 20–51)
LYMPHOCYTES NFR BLD: 15 % (ref 20–51)
LYMPHOCYTES NFR BLD: 17 % (ref 20–51)
LYMPHOCYTES NFR BLD: 17 % (ref 21–52)
MAGNESIUM SERPL-MCNC: 1.4 MG/DL (ref 1.6–2.6)
MAGNESIUM SERPL-MCNC: 1.5 MG/DL (ref 1.6–2.6)
MAGNESIUM SERPL-MCNC: 1.5 MG/DL (ref 1.6–2.6)
MAGNESIUM SERPL-MCNC: 1.6 MG/DL (ref 1.6–2.6)
MAGNESIUM SERPL-MCNC: 2 MG/DL (ref 1.6–2.6)
MCH RBC QN AUTO: 29.4 PG (ref 24–34)
MCH RBC QN AUTO: 30.2 PG (ref 24–34)
MCH RBC QN AUTO: 30.4 PG (ref 24–34)
MCH RBC QN AUTO: 35.2 PG (ref 24–34)
MCHC RBC AUTO-ENTMCNC: 31.9 G/DL (ref 31–37)
MCHC RBC AUTO-ENTMCNC: 31.9 G/DL (ref 31–37)
MCHC RBC AUTO-ENTMCNC: 32.4 G/DL (ref 31–37)
MCHC RBC AUTO-ENTMCNC: 33.2 G/DL (ref 31–37)
MCHC RBC AUTO-ENTMCNC: 33.7 G/DL (ref 31–37)
MCHC RBC AUTO-ENTMCNC: 34 G/DL (ref 31–37)
MCV RBC AUTO: 104.2 FL (ref 74–97)
MCV RBC AUTO: 89.3 FL (ref 74–97)
MCV RBC AUTO: 90.8 FL (ref 74–97)
MCV RBC AUTO: 91.1 FL (ref 74–97)
MCV RBC AUTO: 91.9 FL (ref 74–97)
MCV RBC AUTO: 92.2 FL (ref 74–97)
MONOCYTES # BLD: 0.1 K/UL (ref 0–1)
MONOCYTES # BLD: 0.3 K/UL (ref 0.05–1.2)
MONOCYTES # BLD: 0.3 K/UL (ref 0–1)
MONOCYTES # BLD: 0.6 K/UL (ref 0–1)
MONOCYTES # BLD: 0.7 K/UL (ref 0–1)
MONOCYTES # BLD: 1.1 K/UL (ref 0–1)
MONOCYTES NFR BLD: 1 % (ref 2–9)
MONOCYTES NFR BLD: 12 % (ref 3–10)
MONOCYTES NFR BLD: 3 % (ref 2–9)
MONOCYTES NFR BLD: 4 % (ref 2–9)
MONOCYTES NFR BLD: 7 % (ref 2–9)
MONOCYTES NFR BLD: 7 % (ref 2–9)
MUCOUS THREADS URNS QL MICRO: ABNORMAL /LPF
NEUTS BAND NFR BLD MANUAL: 1 % (ref 0–5)
NEUTS BAND NFR BLD MANUAL: 10 % (ref 0–5)
NEUTS BAND NFR BLD MANUAL: 5 % (ref 0–5)
NEUTS SEG # BLD: 1.7 K/UL (ref 1.8–8)
NEUTS SEG # BLD: 10.3 K/UL (ref 1.8–8)
NEUTS SEG # BLD: 11.3 K/UL (ref 1.8–8)
NEUTS SEG # BLD: 17.2 K/UL (ref 1.8–8)
NEUTS SEG # BLD: 7 K/UL (ref 1.8–8)
NEUTS SEG # BLD: 9.7 K/UL (ref 1.8–8)
NEUTS SEG NFR BLD: 66 % (ref 42–75)
NEUTS SEG NFR BLD: 71 % (ref 40–73)
NEUTS SEG NFR BLD: 73 % (ref 42–75)
NEUTS SEG NFR BLD: 84 % (ref 42–75)
NEUTS SEG NFR BLD: 87 % (ref 42–75)
NEUTS SEG NFR BLD: 94 % (ref 42–75)
NITRITE UR QL STRIP.AUTO: POSITIVE
NRBC BLD-RTO: 1 PER 100 WBC
NRBC BLD-RTO: 3 PER 100 WBC
O2/TOTAL GAS SETTING VFR VENT: 1 %
O2/TOTAL GAS SETTING VFR VENT: 1 %
O2/TOTAL GAS SETTING VFR VENT: 100 %
P-R INTERVAL, ECG05: 134 MS
P-R INTERVAL, ECG05: 144 MS
PCO2 BLD: 27.3 MMHG (ref 35–45)
PCO2 BLD: 28.8 MMHG (ref 35–45)
PCO2 BLD: 29.1 MMHG (ref 35–45)
PCO2 BLD: 31 MMHG (ref 35–45)
PCO2 BLD: 34.3 MMHG (ref 35–45)
PCO2 BLD: 34.7 MMHG (ref 35–45)
PCO2 BLD: 40.4 MMHG (ref 35–45)
PCO2 BLD: 42.2 MMHG (ref 35–45)
PEEP RESPIRATORY: 5 CMH2O
PEEP RESPIRATORY: 8 CMH2O
PEEP RESPIRATORY: 8 CMH2O
PH BLD: 6.91 [PH] (ref 7.35–7.45)
PH BLD: 6.96 [PH] (ref 7.35–7.45)
PH BLD: 6.97 [PH] (ref 7.35–7.45)
PH BLD: 7.03 [PH] (ref 7.35–7.45)
PH BLD: 7.05 [PH] (ref 7.35–7.45)
PH BLD: 7.05 [PH] (ref 7.35–7.45)
PH BLD: 7.06 [PH] (ref 7.35–7.45)
PH BLD: 7.08 [PH] (ref 7.35–7.45)
PH UR STRIP: 5 [PH] (ref 5–8)
PHOSPHATE SERPL-MCNC: 10 MG/DL (ref 2.5–4.9)
PHOSPHATE SERPL-MCNC: 2.3 MG/DL (ref 2.5–4.9)
PHOSPHATE SERPL-MCNC: 8.5 MG/DL (ref 2.5–4.9)
PHOSPHATE SERPL-MCNC: 8.7 MG/DL (ref 2.5–4.9)
PHOSPHATE SERPL-MCNC: 9 MG/DL (ref 2.5–4.9)
PLATELET # BLD AUTO: 100 K/UL (ref 135–420)
PLATELET # BLD AUTO: 11 K/UL (ref 135–420)
PLATELET # BLD AUTO: 131 K/UL (ref 135–420)
PLATELET # BLD AUTO: 138 K/UL (ref 135–420)
PLATELET # BLD AUTO: 29 K/UL (ref 135–420)
PLATELET # BLD AUTO: 64 K/UL (ref 135–420)
PLATELET COMMENTS,PCOM: ABNORMAL
PMV BLD AUTO: 10.1 FL (ref 9.2–11.8)
PMV BLD AUTO: 11.6 FL (ref 9.2–11.8)
PMV BLD AUTO: 8.4 FL (ref 9.2–11.8)
PMV BLD AUTO: 9.6 FL (ref 9.2–11.8)
PMV BLD AUTO: 9.8 FL (ref 9.2–11.8)
PMV BLD AUTO: 9.8 FL (ref 9.2–11.8)
PO2 BLD: 107 MMHG (ref 80–100)
PO2 BLD: 176 MMHG (ref 80–100)
PO2 BLD: 193 MMHG (ref 80–100)
PO2 BLD: 194 MMHG (ref 80–100)
PO2 BLD: 58 MMHG (ref 80–100)
PO2 BLD: 60 MMHG (ref 80–100)
PO2 BLD: 66 MMHG (ref 80–100)
PO2 BLD: 91 MMHG (ref 80–100)
POTASSIUM SERPL-SCNC: 2.9 MMOL/L (ref 3.5–5.5)
POTASSIUM SERPL-SCNC: 4.9 MMOL/L (ref 3.5–5.5)
POTASSIUM SERPL-SCNC: 5.1 MMOL/L (ref 3.5–5.5)
POTASSIUM SERPL-SCNC: 5.6 MMOL/L (ref 3.5–5.5)
POTASSIUM SERPL-SCNC: 5.7 MMOL/L (ref 3.5–5.5)
PROT SERPL-MCNC: 2.3 G/DL (ref 6.4–8.2)
PROT SERPL-MCNC: 3.2 G/DL (ref 6.4–8.2)
PROT SERPL-MCNC: 3.5 G/DL (ref 6.4–8.2)
PROT SERPL-MCNC: 3.5 G/DL (ref 6.4–8.2)
PROT SERPL-MCNC: 5.7 G/DL (ref 6.4–8.2)
PROT UR STRIP-MCNC: ABNORMAL MG/DL
PROTHROMBIN TIME: 21.9 SEC (ref 11.5–15.2)
PROTHROMBIN TIME: 28.6 SEC (ref 11.5–15.2)
PROTHROMBIN TIME: 33.3 SEC (ref 11.5–15.2)
PROTHROMBIN TIME: 33.5 SEC (ref 11.5–15.2)
PROTHROMBIN TIME: 43 SEC (ref 11.5–15.2)
PROTHROMBIN TIME: 57.9 SEC (ref 11.5–15.2)
Q-T INTERVAL, ECG07: 316 MS
Q-T INTERVAL, ECG07: 342 MS
QRS DURATION, ECG06: 84 MS
QRS DURATION, ECG06: 86 MS
QTC CALCULATION (BEZET), ECG08: 446 MS
QTC CALCULATION (BEZET), ECG08: 469 MS
RBC # BLD AUTO: 0.56 M/UL (ref 4.7–5.5)
RBC # BLD AUTO: 2.04 M/UL (ref 4.7–5.5)
RBC # BLD AUTO: 2.35 M/UL (ref 4.7–5.5)
RBC # BLD AUTO: 2.36 M/UL (ref 4.7–5.5)
RBC # BLD AUTO: 3.1 M/UL (ref 4.7–5.5)
RBC # BLD AUTO: 3.6 M/UL (ref 4.7–5.5)
RBC #/AREA URNS HPF: ABNORMAL /HPF (ref 0–5)
RBC MORPH BLD: ABNORMAL
RIGHT CCA DIST DIAS: 12.9 CM/S
RIGHT CCA DIST SYS: 74.2 CM/S
RIGHT CCA MID DIAS: 14.22 CM/S
RIGHT CCA MID SYS: 76.83 CM/S
RIGHT CCA PROX DIAS: 14.2 CM/S
RIGHT CCA PROX SYS: 79.4 CM/S
RIGHT ECA DIAS: 11.63 CM/S
RIGHT ECA SYS: 114.1 CM/S
RIGHT ICA DIST DIAS: 12.2 CM/S
RIGHT ICA DIST SYS: 61.6 CM/S
RIGHT ICA MID DIAS: 12.8 CM/S
RIGHT ICA MID SYS: 54.2 CM/S
RIGHT ICA PROX DIAS: 11.5 CM/S
RIGHT ICA PROX SYS: 47.8 CM/S
RIGHT ICA/CCA SYS: 0.8
RIGHT SUBCLAVIAN SYS: 75.5 CM/S
RIGHT VERTEBRAL DIAS: 4.01 CM/S
RIGHT VERTEBRAL SYS: 37.9 CM/S
SAO2 % BLD: 77 % (ref 92–97)
SAO2 % BLD: 79 % (ref 92–97)
SAO2 % BLD: 79 % (ref 92–97)
SAO2 % BLD: 92 % (ref 92–97)
SAO2 % BLD: 95 % (ref 92–97)
SAO2 % BLD: 98 % (ref 92–97)
SAO2 % BLD: 99 % (ref 92–97)
SAO2 % BLD: 99 % (ref 92–97)
SERVICE CMNT-IMP: ABNORMAL
SODIUM SERPL-SCNC: 142 MMOL/L (ref 136–145)
SODIUM SERPL-SCNC: 143 MMOL/L (ref 136–145)
SODIUM SERPL-SCNC: 146 MMOL/L (ref 136–145)
SODIUM SERPL-SCNC: 147 MMOL/L (ref 136–145)
SODIUM SERPL-SCNC: 150 MMOL/L (ref 136–145)
SP GR UR REFRACTOMETRY: 1.02 (ref 1–1.03)
SPECIMEN TYPE: ABNORMAL
STATUS OF UNIT,%ST: NORMAL
TOTAL RESP. RATE, ITRR: 18
TOTAL RESP. RATE, ITRR: 23
TOTAL RESP. RATE, ITRR: 24
TROPONIN I SERPL-MCNC: 0.1 NG/ML (ref 0–0.04)
TROPONIN I SERPL-MCNC: 0.12 NG/ML (ref 0–0.04)
TROPONIN I SERPL-MCNC: 0.18 NG/ML (ref 0–0.04)
UNIT DIVISION, %UDIV: 0
UROBILINOGEN UR QL STRIP.AUTO: 1 EU/DL (ref 0.2–1)
VENTILATION MODE VENT: ABNORMAL
VENTRICULAR RATE, ECG03: 113 BPM
VENTRICULAR RATE, ECG03: 120 BPM
VOLUME CONTROL PLUS IVLCP: YES
VT SETTING VENT: 450 ML
WBC # BLD AUTO: 11.4 K/UL (ref 4.6–13.2)
WBC # BLD AUTO: 11.7 K/UL (ref 4.6–13.2)
WBC # BLD AUTO: 15 K/UL (ref 4.6–13.2)
WBC # BLD AUTO: 18.1 K/UL (ref 4.6–13.2)
WBC # BLD AUTO: 2.4 K/UL (ref 4.6–13.2)
WBC # BLD AUTO: 8.9 K/UL (ref 4.6–13.2)
WBC MORPH BLD: ABNORMAL
WBC URNS QL MICRO: ABNORMAL /HPF (ref 0–4)

## 2019-01-01 PROCEDURE — 74011000258 HC RX REV CODE- 258: Performed by: INTERNAL MEDICINE

## 2019-01-01 PROCEDURE — 85025 COMPLETE CBC W/AUTO DIFF WBC: CPT

## 2019-01-01 PROCEDURE — 82550 ASSAY OF CK (CPK): CPT

## 2019-01-01 PROCEDURE — 96376 TX/PRO/DX INJ SAME DRUG ADON: CPT

## 2019-01-01 PROCEDURE — 74011250636 HC RX REV CODE- 250/636

## 2019-01-01 PROCEDURE — 74018 RADEX ABDOMEN 1 VIEW: CPT

## 2019-01-01 PROCEDURE — 74011250636 HC RX REV CODE- 250/636: Performed by: INTERNAL MEDICINE

## 2019-01-01 PROCEDURE — 65610000006 HC RM INTENSIVE CARE

## 2019-01-01 PROCEDURE — P9047 ALBUMIN (HUMAN), 25%, 50ML: HCPCS

## 2019-01-01 PROCEDURE — 82803 BLOOD GASES ANY COMBINATION: CPT

## 2019-01-01 PROCEDURE — 30233K1 TRANSFUSION OF NONAUTOLOGOUS FROZEN PLASMA INTO PERIPHERAL VEIN, PERCUTANEOUS APPROACH: ICD-10-PCS | Performed by: INTERNAL MEDICINE

## 2019-01-01 PROCEDURE — 77030040922 HC BLNKT HYPOTHRM STRY -A

## 2019-01-01 PROCEDURE — 96374 THER/PROPH/DIAG INJ IV PUSH: CPT

## 2019-01-01 PROCEDURE — 88305 TISSUE EXAM BY PATHOLOGIST: CPT

## 2019-01-01 PROCEDURE — 71045 X-RAY EXAM CHEST 1 VIEW: CPT

## 2019-01-01 PROCEDURE — 80048 BASIC METABOLIC PNL TOTAL CA: CPT

## 2019-01-01 PROCEDURE — 36600 WITHDRAWAL OF ARTERIAL BLOOD: CPT

## 2019-01-01 PROCEDURE — 77030005513 HC CATH URETH FOL11 MDII -B

## 2019-01-01 PROCEDURE — 76010000379 HC BRONCHOSCOPY DIAG/THERAPEUTIC

## 2019-01-01 PROCEDURE — 93880 EXTRACRANIAL BILAT STUDY: CPT

## 2019-01-01 PROCEDURE — 74011000258 HC RX REV CODE- 258: Performed by: EMERGENCY MEDICINE

## 2019-01-01 PROCEDURE — 83605 ASSAY OF LACTIC ACID: CPT

## 2019-01-01 PROCEDURE — 36430 TRANSFUSION BLD/BLD COMPNT: CPT

## 2019-01-01 PROCEDURE — 94003 VENT MGMT INPAT SUBQ DAY: CPT

## 2019-01-01 PROCEDURE — P9016 RBC LEUKOCYTES REDUCED: HCPCS

## 2019-01-01 PROCEDURE — 74011000250 HC RX REV CODE- 250

## 2019-01-01 PROCEDURE — 87040 BLOOD CULTURE FOR BACTERIA: CPT

## 2019-01-01 PROCEDURE — 76040000007: Performed by: INTERNAL MEDICINE

## 2019-01-01 PROCEDURE — 6A551Z2 PHERESIS OF PLATELETS, MULTIPLE: ICD-10-PCS | Performed by: INTERNAL MEDICINE

## 2019-01-01 PROCEDURE — 31500 INSERT EMERGENCY AIRWAY: CPT

## 2019-01-01 PROCEDURE — C1751 CATH, INF, PER/CENT/MIDLINE: HCPCS

## 2019-01-01 PROCEDURE — 74011000250 HC RX REV CODE- 250: Performed by: STUDENT IN AN ORGANIZED HEALTH CARE EDUCATION/TRAINING PROGRAM

## 2019-01-01 PROCEDURE — 77030008565 HC TBNG SUC IRR ERBE -B: Performed by: INTERNAL MEDICINE

## 2019-01-01 PROCEDURE — C9113 INJ PANTOPRAZOLE SODIUM, VIA: HCPCS | Performed by: EMERGENCY MEDICINE

## 2019-01-01 PROCEDURE — 77030037378 HC BRONCHOSCOPE DISP TRAN -D

## 2019-01-01 PROCEDURE — 85018 HEMOGLOBIN: CPT

## 2019-01-01 PROCEDURE — 36556 INSERT NON-TUNNEL CV CATH: CPT

## 2019-01-01 PROCEDURE — 77030038604 HC SNR ENDO EXACTO USEN -B: Performed by: INTERNAL MEDICINE

## 2019-01-01 PROCEDURE — P9047 ALBUMIN (HUMAN), 25%, 50ML: HCPCS | Performed by: PHYSICIAN ASSISTANT

## 2019-01-01 PROCEDURE — 74011250636 HC RX REV CODE- 250/636: Performed by: PHYSICIAN ASSISTANT

## 2019-01-01 PROCEDURE — 85610 PROTHROMBIN TIME: CPT

## 2019-01-01 PROCEDURE — 77030037878 HC DRSG MEPILEX >48IN BORD MOLN -B

## 2019-01-01 PROCEDURE — 30233M1 TRANSFUSION OF NONAUTOLOGOUS PLASMA CRYOPRECIPITATE INTO PERIPHERAL VEIN, PERCUTANEOUS APPROACH: ICD-10-PCS | Performed by: INTERNAL MEDICINE

## 2019-01-01 PROCEDURE — 84100 ASSAY OF PHOSPHORUS: CPT

## 2019-01-01 PROCEDURE — 0W3P7ZZ CONTROL BLEEDING IN GASTROINTESTINAL TRACT, VIA NATURAL OR ARTIFICIAL OPENING: ICD-10-PCS | Performed by: INTERNAL MEDICINE

## 2019-01-01 PROCEDURE — 92950 HEART/LUNG RESUSCITATION CPR: CPT

## 2019-01-01 PROCEDURE — 82330 ASSAY OF CALCIUM: CPT

## 2019-01-01 PROCEDURE — 76705 ECHO EXAM OF ABDOMEN: CPT

## 2019-01-01 PROCEDURE — 80053 COMPREHEN METABOLIC PANEL: CPT

## 2019-01-01 PROCEDURE — 74011000258 HC RX REV CODE- 258: Performed by: HOSPITALIST

## 2019-01-01 PROCEDURE — 74011000250 HC RX REV CODE- 250: Performed by: PHYSICIAN ASSISTANT

## 2019-01-01 PROCEDURE — 77030008771 HC TU NG SALEM SUMP -A

## 2019-01-01 PROCEDURE — 77030018846 HC SOL IRR STRL H20 ICUM -A: Performed by: INTERNAL MEDICINE

## 2019-01-01 PROCEDURE — 77030014179 HC APPL CLP RESOL BSC -C: Performed by: INTERNAL MEDICINE

## 2019-01-01 PROCEDURE — 36620 INSERTION CATHETER ARTERY: CPT

## 2019-01-01 PROCEDURE — 65660000004 HC RM CVT STEPDOWN

## 2019-01-01 PROCEDURE — P9059 PLASMA, FRZ BETWEEN 8-24HOUR: HCPCS

## 2019-01-01 PROCEDURE — 74011250636 HC RX REV CODE- 250/636: Performed by: EMERGENCY MEDICINE

## 2019-01-01 PROCEDURE — 36592 COLLECT BLOOD FROM PICC: CPT

## 2019-01-01 PROCEDURE — 77030013797 HC KT TRNSDUC PRSSR EDWD -A

## 2019-01-01 PROCEDURE — 74022 RADEX COMPL AQT ABD SERIES: CPT

## 2019-01-01 PROCEDURE — 86923 COMPATIBILITY TEST ELECTRIC: CPT

## 2019-01-01 PROCEDURE — 86900 BLOOD TYPING SEROLOGIC ABO: CPT

## 2019-01-01 PROCEDURE — 03H533Z INSERTION OF INFUSION DEVICE INTO RIGHT AXILLARY ARTERY, PERCUTANEOUS APPROACH: ICD-10-PCS | Performed by: PHYSICIAN ASSISTANT

## 2019-01-01 PROCEDURE — 96375 TX/PRO/DX INJ NEW DRUG ADDON: CPT

## 2019-01-01 PROCEDURE — 0BH17EZ INSERTION OF ENDOTRACHEAL AIRWAY INTO TRACHEA, VIA NATURAL OR ARTIFICIAL OPENING: ICD-10-PCS | Performed by: NURSE ANESTHETIST, CERTIFIED REGISTERED

## 2019-01-01 PROCEDURE — 30233N1 TRANSFUSION OF NONAUTOLOGOUS RED BLOOD CELLS INTO PERIPHERAL VEIN, PERCUTANEOUS APPROACH: ICD-10-PCS | Performed by: INTERNAL MEDICINE

## 2019-01-01 PROCEDURE — 94002 VENT MGMT INPAT INIT DAY: CPT

## 2019-01-01 PROCEDURE — 83735 ASSAY OF MAGNESIUM: CPT

## 2019-01-01 PROCEDURE — C9113 INJ PANTOPRAZOLE SODIUM, VIA: HCPCS | Performed by: STUDENT IN AN ORGANIZED HEALTH CARE EDUCATION/TRAINING PROGRAM

## 2019-01-01 PROCEDURE — 93005 ELECTROCARDIOGRAM TRACING: CPT

## 2019-01-01 PROCEDURE — 83690 ASSAY OF LIPASE: CPT

## 2019-01-01 PROCEDURE — 77030019896 HC KT ARTERIAL LN TELE -B

## 2019-01-01 PROCEDURE — 94762 N-INVAS EAR/PLS OXIMTRY CONT: CPT

## 2019-01-01 PROCEDURE — P9047 ALBUMIN (HUMAN), 25%, 50ML: HCPCS | Performed by: INTERNAL MEDICINE

## 2019-01-01 PROCEDURE — 77030036958 HC BRONCHSCOPE ASCOPE3 FLX DISP AMBU -D

## 2019-01-01 PROCEDURE — 82962 GLUCOSE BLOOD TEST: CPT

## 2019-01-01 PROCEDURE — 74011000258 HC RX REV CODE- 258: Performed by: PHYSICIAN ASSISTANT

## 2019-01-01 PROCEDURE — 5A1945Z RESPIRATORY VENTILATION, 24-96 CONSECUTIVE HOURS: ICD-10-PCS | Performed by: INTERNAL MEDICINE

## 2019-01-01 PROCEDURE — 85384 FIBRINOGEN ACTIVITY: CPT

## 2019-01-01 PROCEDURE — 02HV33Z INSERTION OF INFUSION DEVICE INTO SUPERIOR VENA CAVA, PERCUTANEOUS APPROACH: ICD-10-PCS | Performed by: PHYSICIAN ASSISTANT

## 2019-01-01 PROCEDURE — 80076 HEPATIC FUNCTION PANEL: CPT

## 2019-01-01 PROCEDURE — 76060000032 HC ANESTHESIA 0.5 TO 1 HR: Performed by: INTERNAL MEDICINE

## 2019-01-01 PROCEDURE — P9035 PLATELET PHERES LEUKOREDUCED: HCPCS

## 2019-01-01 PROCEDURE — 85730 THROMBOPLASTIN TIME PARTIAL: CPT

## 2019-01-01 PROCEDURE — C1894 INTRO/SHEATH, NON-LASER: HCPCS

## 2019-01-01 PROCEDURE — 74176 CT ABD & PELVIS W/O CONTRAST: CPT

## 2019-01-01 PROCEDURE — 0DJ68ZZ INSPECTION OF STOMACH, VIA NATURAL OR ARTIFICIAL OPENING ENDOSCOPIC: ICD-10-PCS | Performed by: INTERNAL MEDICINE

## 2019-01-01 PROCEDURE — 77030020454 HC TU ET CUF HI/LO COVD -B

## 2019-01-01 PROCEDURE — 36591 DRAW BLOOD OFF VENOUS DEVICE: CPT

## 2019-01-01 PROCEDURE — 74011000250 HC RX REV CODE- 250: Performed by: EMERGENCY MEDICINE

## 2019-01-01 PROCEDURE — P9012 CRYOPRECIPITATE EACH UNIT: HCPCS

## 2019-01-01 PROCEDURE — 99285 EMERGENCY DEPT VISIT HI MDM: CPT

## 2019-01-01 PROCEDURE — 74011250636 HC RX REV CODE- 250/636: Performed by: HOSPITALIST

## 2019-01-01 PROCEDURE — 81001 URINALYSIS AUTO W/SCOPE: CPT

## 2019-01-01 PROCEDURE — 77030003657 HC NDL SCLER BSC -B: Performed by: INTERNAL MEDICINE

## 2019-01-01 PROCEDURE — 74011250636 HC RX REV CODE- 250/636: Performed by: STUDENT IN AN ORGANIZED HEALTH CARE EDUCATION/TRAINING PROGRAM

## 2019-01-01 PROCEDURE — 02HV33Z INSERTION OF INFUSION DEVICE INTO SUPERIOR VENA CAVA, PERCUTANEOUS APPROACH: ICD-10-PCS | Performed by: INTERNAL MEDICINE

## 2019-01-01 PROCEDURE — 36415 COLL VENOUS BLD VENIPUNCTURE: CPT

## 2019-01-01 PROCEDURE — 74011250636 HC RX REV CODE- 250/636: Performed by: NURSE ANESTHETIST, CERTIFIED REGISTERED

## 2019-01-01 PROCEDURE — 77030041247 HC PROTECTOR HEEL HEELMEDIX MDII -B

## 2019-01-01 PROCEDURE — 82140 ASSAY OF AMMONIA: CPT

## 2019-01-01 PROCEDURE — 74011000250 HC RX REV CODE- 250: Performed by: INTERNAL MEDICINE

## 2019-01-01 PROCEDURE — 76040000019: Performed by: INTERNAL MEDICINE

## 2019-01-01 RX ORDER — MAGNESIUM SULFATE HEPTAHYDRATE 40 MG/ML
2 INJECTION, SOLUTION INTRAVENOUS ONCE
Status: COMPLETED | OUTPATIENT
Start: 2019-01-01 | End: 2019-01-01

## 2019-01-01 RX ORDER — SODIUM CHLORIDE 0.9 % (FLUSH) 0.9 %
5-40 SYRINGE (ML) INJECTION AS NEEDED
Status: DISCONTINUED | OUTPATIENT
Start: 2019-01-01 | End: 2019-01-01 | Stop reason: HOSPADM

## 2019-01-01 RX ORDER — MAGNESIUM SULFATE HEPTAHYDRATE 40 MG/ML
INJECTION, SOLUTION INTRAVENOUS
Status: COMPLETED
Start: 2019-01-01 | End: 2019-01-01

## 2019-01-01 RX ORDER — SODIUM CHLORIDE 9 MG/ML
250 INJECTION, SOLUTION INTRAVENOUS AS NEEDED
Status: DISCONTINUED | OUTPATIENT
Start: 2019-01-01 | End: 2019-01-01 | Stop reason: HOSPADM

## 2019-01-01 RX ORDER — FUROSEMIDE 10 MG/ML
40 INJECTION INTRAMUSCULAR; INTRAVENOUS DAILY
Status: DISCONTINUED | OUTPATIENT
Start: 2019-01-01 | End: 2019-01-01

## 2019-01-01 RX ORDER — SODIUM CHLORIDE 0.9 % (FLUSH) 0.9 %
5-40 SYRINGE (ML) INJECTION EVERY 8 HOURS
Status: DISCONTINUED | OUTPATIENT
Start: 2019-01-01 | End: 2019-01-01 | Stop reason: HOSPADM

## 2019-01-01 RX ORDER — SODIUM CHLORIDE 9 MG/ML
250 INJECTION, SOLUTION INTRAVENOUS AS NEEDED
Status: DISCONTINUED | OUTPATIENT
Start: 2019-01-01 | End: 2019-01-01

## 2019-01-01 RX ORDER — AMLODIPINE BESYLATE 10 MG/1
10 TABLET ORAL DAILY
Status: DISCONTINUED | OUTPATIENT
Start: 2019-01-01 | End: 2019-01-01

## 2019-01-01 RX ORDER — NOREPINEPHRINE BITARTRATE/D5W 8 MG/250ML
PLASTIC BAG, INJECTION (ML) INTRAVENOUS
Status: COMPLETED
Start: 2019-01-01 | End: 2019-01-01

## 2019-01-01 RX ORDER — NOREPINEPHRINE BITARTRATE/D5W 8 MG/250ML
.5-3 PLASTIC BAG, INJECTION (ML) INTRAVENOUS
Status: DISCONTINUED | OUTPATIENT
Start: 2019-01-01 | End: 2019-01-01 | Stop reason: HOSPADM

## 2019-01-01 RX ORDER — ALBUMIN HUMAN 250 G/1000ML
12.5 SOLUTION INTRAVENOUS ONCE
Status: COMPLETED | OUTPATIENT
Start: 2019-01-01 | End: 2019-01-01

## 2019-01-01 RX ORDER — PANTOPRAZOLE SODIUM 40 MG/10ML
40 INJECTION, POWDER, LYOPHILIZED, FOR SOLUTION INTRAVENOUS
Status: DISCONTINUED | OUTPATIENT
Start: 2019-01-01 | End: 2019-01-01 | Stop reason: CLARIF

## 2019-01-01 RX ORDER — IPRATROPIUM BROMIDE AND ALBUTEROL SULFATE 2.5; .5 MG/3ML; MG/3ML
3 SOLUTION RESPIRATORY (INHALATION)
Status: DISCONTINUED | OUTPATIENT
Start: 2019-01-01 | End: 2019-01-01 | Stop reason: HOSPADM

## 2019-01-01 RX ORDER — FENTANYL CITRATE 50 UG/ML
INJECTION, SOLUTION INTRAMUSCULAR; INTRAVENOUS
Status: DISPENSED
Start: 2019-01-01 | End: 2019-01-01

## 2019-01-01 RX ORDER — THERA TABS 400 MCG
1 TAB ORAL DAILY
Status: DISCONTINUED | OUTPATIENT
Start: 2019-01-01 | End: 2019-01-01

## 2019-01-01 RX ORDER — FENTANYL CITRATE 50 UG/ML
25 INJECTION, SOLUTION INTRAMUSCULAR; INTRAVENOUS ONCE
Status: COMPLETED | OUTPATIENT
Start: 2019-01-01 | End: 2019-01-01

## 2019-01-01 RX ORDER — MAGNESIUM SULFATE 100 %
4 CRYSTALS MISCELLANEOUS AS NEEDED
Status: DISCONTINUED | OUTPATIENT
Start: 2019-01-01 | End: 2019-01-01 | Stop reason: HOSPADM

## 2019-01-01 RX ORDER — SODIUM BICARBONATE 1 MEQ/ML
50 SYRINGE (ML) INTRAVENOUS ONCE
Status: DISCONTINUED | OUTPATIENT
Start: 2019-01-01 | End: 2019-01-01

## 2019-01-01 RX ORDER — PROPOFOL 10 MG/ML
INJECTION, EMULSION INTRAVENOUS AS NEEDED
Status: DISCONTINUED | OUTPATIENT
Start: 2019-01-01 | End: 2019-01-01 | Stop reason: HOSPADM

## 2019-01-01 RX ORDER — POTASSIUM CHLORIDE 14.9 MG/ML
20 INJECTION INTRAVENOUS ONCE
Status: DISCONTINUED | OUTPATIENT
Start: 2019-01-01 | End: 2019-01-01

## 2019-01-01 RX ORDER — SODIUM CHLORIDE 0.9 % (FLUSH) 0.9 %
5-10 SYRINGE (ML) INJECTION AS NEEDED
Status: DISCONTINUED | OUTPATIENT
Start: 2019-01-01 | End: 2019-01-01 | Stop reason: SDUPTHER

## 2019-01-01 RX ORDER — HYDROCODONE BITARTRATE AND ACETAMINOPHEN 5; 325 MG/1; MG/1
1 TABLET ORAL
Status: DISCONTINUED | OUTPATIENT
Start: 2019-01-01 | End: 2019-01-01

## 2019-01-01 RX ORDER — EPINEPHRINE 0.1 MG/ML
INJECTION INTRACARDIAC; INTRAVENOUS AS NEEDED
Status: DISCONTINUED | OUTPATIENT
Start: 2019-01-01 | End: 2019-01-01 | Stop reason: HOSPADM

## 2019-01-01 RX ORDER — FAMOTIDINE 10 MG/ML
INJECTION INTRAVENOUS
Status: DISPENSED
Start: 2019-01-01 | End: 2019-01-01

## 2019-01-01 RX ORDER — VASOPRESSIN 20 U/ML
0.4 INJECTION PARENTERAL ONCE
Status: DISCONTINUED | OUTPATIENT
Start: 2019-01-01 | End: 2019-01-01

## 2019-01-01 RX ORDER — ALBUMIN HUMAN 250 G/1000ML
SOLUTION INTRAVENOUS
Status: COMPLETED
Start: 2019-01-01 | End: 2019-01-01

## 2019-01-01 RX ORDER — ALBUMIN HUMAN 250 G/1000ML
25 SOLUTION INTRAVENOUS ONCE
Status: COMPLETED | OUTPATIENT
Start: 2019-01-01 | End: 2019-01-01

## 2019-01-01 RX ORDER — FENTANYL CITRATE 50 UG/ML
50 INJECTION, SOLUTION INTRAMUSCULAR; INTRAVENOUS ONCE
Status: COMPLETED | OUTPATIENT
Start: 2019-01-01 | End: 2019-01-01

## 2019-01-01 RX ORDER — NOREPINEPHRINE BITARTRATE/D5W 8 MG/250ML
PLASTIC BAG, INJECTION (ML) INTRAVENOUS
Status: COMPLETED | OUTPATIENT
Start: 2019-01-01 | End: 2019-01-01

## 2019-01-01 RX ORDER — CHLORHEXIDINE GLUCONATE 1.2 MG/ML
10 RINSE ORAL EVERY 12 HOURS
Status: DISCONTINUED | OUTPATIENT
Start: 2019-01-01 | End: 2019-01-01 | Stop reason: HOSPADM

## 2019-01-01 RX ORDER — SODIUM BICARBONATE 1 MEQ/ML
SYRINGE (ML) INTRAVENOUS
Status: COMPLETED
Start: 2019-01-01 | End: 2019-01-01

## 2019-01-01 RX ORDER — TAMSULOSIN HYDROCHLORIDE 0.4 MG/1
0.4 CAPSULE ORAL
Status: DISCONTINUED | OUTPATIENT
Start: 2019-01-01 | End: 2019-01-01 | Stop reason: HOSPADM

## 2019-01-01 RX ORDER — FENTANYL CITRATE 50 UG/ML
25-50 INJECTION, SOLUTION INTRAMUSCULAR; INTRAVENOUS
Status: DISCONTINUED | OUTPATIENT
Start: 2019-01-01 | End: 2019-01-01 | Stop reason: HOSPADM

## 2019-01-01 RX ORDER — DEXTROSE 50 % IN WATER (D50W) INTRAVENOUS SYRINGE
25-50 AS NEEDED
Status: DISCONTINUED | OUTPATIENT
Start: 2019-01-01 | End: 2019-01-01 | Stop reason: HOSPADM

## 2019-01-01 RX ORDER — POTASSIUM CHLORIDE 20 MEQ/1
40 TABLET, EXTENDED RELEASE ORAL DAILY
Status: DISCONTINUED | OUTPATIENT
Start: 2019-01-01 | End: 2019-01-01 | Stop reason: HOSPADM

## 2019-01-01 RX ORDER — SODIUM BICARBONATE 1 MEQ/ML
150 SYRINGE (ML) INTRAVENOUS ONCE
Status: COMPLETED | OUTPATIENT
Start: 2019-01-01 | End: 2019-01-01

## 2019-01-01 RX ORDER — NOREPINEPHRINE BITARTRATE/D5W 8 MG/250ML
PLASTIC BAG, INJECTION (ML) INTRAVENOUS
Status: DISPENSED
Start: 2019-01-01 | End: 2019-01-01

## 2019-01-01 RX ORDER — SODIUM BICARBONATE 1 MEQ/ML
50 SYRINGE (ML) INTRAVENOUS ONCE
Status: COMPLETED | OUTPATIENT
Start: 2019-01-01 | End: 2019-01-01

## 2019-01-01 RX ORDER — ALBUMIN HUMAN 250 G/1000ML
SOLUTION INTRAVENOUS
Status: DISCONTINUED
Start: 2019-01-01 | End: 2019-01-01 | Stop reason: HOSPADM

## 2019-01-01 RX ORDER — CLONIDINE HYDROCHLORIDE 0.1 MG/1
0.1 TABLET ORAL 3 TIMES DAILY
Status: DISCONTINUED | OUTPATIENT
Start: 2019-01-01 | End: 2019-01-01

## 2019-01-01 RX ORDER — POTASSIUM CHLORIDE 14.9 MG/ML
10 INJECTION INTRAVENOUS
Status: COMPLETED | OUTPATIENT
Start: 2019-01-01 | End: 2019-01-01

## 2019-01-01 RX ORDER — MIDAZOLAM HYDROCHLORIDE 1 MG/ML
INJECTION, SOLUTION INTRAMUSCULAR; INTRAVENOUS
Status: COMPLETED
Start: 2019-01-01 | End: 2019-01-01

## 2019-01-01 RX ORDER — SODIUM BICARBONATE 1 MEQ/ML
SYRINGE (ML) INTRAVENOUS
Status: COMPLETED | OUTPATIENT
Start: 2019-01-01 | End: 2019-01-01

## 2019-01-01 RX ORDER — PROPOFOL 10 MG/ML
VIAL (ML) INTRAVENOUS AS NEEDED
Status: DISCONTINUED | OUTPATIENT
Start: 2019-01-01 | End: 2019-01-01 | Stop reason: HOSPADM

## 2019-01-01 RX ORDER — ALBUMIN HUMAN 250 G/1000ML
25 SOLUTION INTRAVENOUS EVERY 6 HOURS
Status: COMPLETED | OUTPATIENT
Start: 2019-01-01 | End: 2019-01-01

## 2019-01-01 RX ORDER — SODIUM CHLORIDE, SODIUM LACTATE, POTASSIUM CHLORIDE, CALCIUM CHLORIDE 600; 310; 30; 20 MG/100ML; MG/100ML; MG/100ML; MG/100ML
50 INJECTION, SOLUTION INTRAVENOUS CONTINUOUS
Status: DISCONTINUED | OUTPATIENT
Start: 2019-01-01 | End: 2019-01-01 | Stop reason: HOSPADM

## 2019-01-01 RX ORDER — ROCURONIUM BROMIDE 10 MG/ML
INJECTION, SOLUTION INTRAVENOUS AS NEEDED
Status: DISCONTINUED | OUTPATIENT
Start: 2019-01-01 | End: 2019-01-01 | Stop reason: HOSPADM

## 2019-01-01 RX ORDER — MIDAZOLAM HYDROCHLORIDE 1 MG/ML
1-2 INJECTION, SOLUTION INTRAMUSCULAR; INTRAVENOUS
Status: DISCONTINUED | OUTPATIENT
Start: 2019-01-01 | End: 2019-01-01 | Stop reason: HOSPADM

## 2019-01-01 RX ORDER — ONDANSETRON 2 MG/ML
4 INJECTION INTRAMUSCULAR; INTRAVENOUS
Status: DISCONTINUED | OUTPATIENT
Start: 2019-01-01 | End: 2019-01-01 | Stop reason: HOSPADM

## 2019-01-01 RX ADMIN — Medication 16 MCG/MIN: at 10:15

## 2019-01-01 RX ADMIN — VASOPRESSIN 1 UNITS/MIN: 20 INJECTION INTRAVENOUS at 08:08

## 2019-01-01 RX ADMIN — CALCIUM GLUCONATE 3 G: 98 INJECTION, SOLUTION INTRAVENOUS at 17:51

## 2019-01-01 RX ADMIN — VASOPRESSIN 0.4 UNITS/MIN: 20 INJECTION INTRAVENOUS at 23:49

## 2019-01-01 RX ADMIN — MAGNESIUM SULFATE 2 G: 2 INJECTION INTRAVENOUS at 17:56

## 2019-01-01 RX ADMIN — PANTOPRAZOLE SODIUM 8 MG/HR: 40 INJECTION, POWDER, FOR SOLUTION INTRAVENOUS at 17:28

## 2019-01-01 RX ADMIN — ALBUMIN (HUMAN) 25 G: 0.25 INJECTION, SOLUTION INTRAVENOUS at 17:56

## 2019-01-01 RX ADMIN — SODIUM CHLORIDE 80 MG: 9 INJECTION INTRAMUSCULAR; INTRAVENOUS; SUBCUTANEOUS at 05:00

## 2019-01-01 RX ADMIN — PIPERACILLIN AND TAZOBACTAM 3.38 G: 3; .375 INJECTION, POWDER, LYOPHILIZED, FOR SOLUTION INTRAVENOUS at 17:32

## 2019-01-01 RX ADMIN — PIPERACILLIN AND TAZOBACTAM 3.38 G: 3; .375 INJECTION, POWDER, LYOPHILIZED, FOR SOLUTION INTRAVENOUS at 22:29

## 2019-01-01 RX ADMIN — Medication 30 MCG/MIN: at 03:39

## 2019-01-01 RX ADMIN — VASOPRESSIN 0.4 UNITS/MIN: 20 INJECTION INTRAVENOUS at 14:15

## 2019-01-01 RX ADMIN — POTASSIUM CHLORIDE 10 MEQ: 200 INJECTION, SOLUTION INTRAVENOUS at 11:28

## 2019-01-01 RX ADMIN — CALCIUM GLUCONATE 2 G: 98 INJECTION, SOLUTION INTRAVENOUS at 08:33

## 2019-01-01 RX ADMIN — SODIUM CHLORIDE 8 MG/HR: 9 INJECTION, SOLUTION INTRAVENOUS at 12:59

## 2019-01-01 RX ADMIN — Medication 10 ML: at 14:00

## 2019-01-01 RX ADMIN — PROPOFOL 150 MG: 10 INJECTION, EMULSION INTRAVENOUS at 15:35

## 2019-01-01 RX ADMIN — POTASSIUM CHLORIDE 10 MEQ: 200 INJECTION, SOLUTION INTRAVENOUS at 12:30

## 2019-01-01 RX ADMIN — PROPOFOL 100 MG: 10 INJECTION, EMULSION INTRAVENOUS at 15:28

## 2019-01-01 RX ADMIN — PIPERACILLIN AND TAZOBACTAM 3.38 G: 3; .375 INJECTION, POWDER, LYOPHILIZED, FOR SOLUTION INTRAVENOUS at 23:55

## 2019-01-01 RX ADMIN — PROPOFOL 200 MG: 10 INJECTION, EMULSION INTRAVENOUS at 15:20

## 2019-01-01 RX ADMIN — DEXTROSE 50 % IN WATER (D50W) INTRAVENOUS SYRINGE 12.5 G: at 18:25

## 2019-01-01 RX ADMIN — Medication 50 MEQ: at 18:00

## 2019-01-01 RX ADMIN — Medication 150 MEQ: at 16:45

## 2019-01-01 RX ADMIN — Medication 80 MG: at 06:29

## 2019-01-01 RX ADMIN — ALBUMIN (HUMAN) 12.5 G: 0.25 INJECTION, SOLUTION INTRAVENOUS at 08:30

## 2019-01-01 RX ADMIN — FENTANYL CITRATE 50 MCG: 50 INJECTION, SOLUTION INTRAMUSCULAR; INTRAVENOUS at 11:34

## 2019-01-01 RX ADMIN — ALBUMIN HUMAN 25 G: 250 SOLUTION INTRAVENOUS at 04:00

## 2019-01-01 RX ADMIN — VASOPRESSIN 0.4 UNITS/MIN: 20 INJECTION INTRAVENOUS at 04:48

## 2019-01-01 RX ADMIN — ALBUMIN (HUMAN) 25 G: 0.25 INJECTION, SOLUTION INTRAVENOUS at 13:22

## 2019-01-01 RX ADMIN — SODIUM CHLORIDE, SODIUM LACTATE, POTASSIUM CHLORIDE, AND CALCIUM CHLORIDE 50 ML/HR: 600; 310; 30; 20 INJECTION, SOLUTION INTRAVENOUS at 14:10

## 2019-01-01 RX ADMIN — FENTANYL CITRATE 25 MCG: 50 INJECTION, SOLUTION INTRAMUSCULAR; INTRAVENOUS at 09:06

## 2019-01-01 RX ADMIN — PIPERACILLIN AND TAZOBACTAM 3.38 G: 3; .375 INJECTION, POWDER, LYOPHILIZED, FOR SOLUTION INTRAVENOUS at 02:45

## 2019-01-01 RX ADMIN — EPINEPHRINE 10 MCG/MIN: 1 INJECTION INTRAMUSCULAR; INTRAVENOUS; SUBCUTANEOUS at 05:13

## 2019-01-01 RX ADMIN — ROCURONIUM BROMIDE 30 MG: 10 INJECTION, SOLUTION INTRAVENOUS at 06:29

## 2019-01-01 RX ADMIN — PHYTONADIONE 10 MG: 10 INJECTION, EMULSION INTRAMUSCULAR; INTRAVENOUS; SUBCUTANEOUS at 09:07

## 2019-01-01 RX ADMIN — POTASSIUM CHLORIDE 10 MEQ: 200 INJECTION, SOLUTION INTRAVENOUS at 13:30

## 2019-01-01 RX ADMIN — SODIUM BICARBONATE 150 MEQ: 84 INJECTION, SOLUTION INTRAVENOUS at 16:45

## 2019-01-01 RX ADMIN — SODIUM CHLORIDE 1000 ML: 900 INJECTION, SOLUTION INTRAVENOUS at 03:40

## 2019-01-01 RX ADMIN — CHLORHEXIDINE GLUCONATE 0.12% ORAL RINSE 10 ML: 1.2 LIQUID ORAL at 22:45

## 2019-01-01 RX ADMIN — PROPOFOL 250 MG: 10 INJECTION, EMULSION INTRAVENOUS at 15:07

## 2019-01-01 RX ADMIN — Medication 30 MCG/MIN: at 22:15

## 2019-01-01 RX ADMIN — CHLORHEXIDINE GLUCONATE 0.12% ORAL RINSE 10 ML: 1.2 LIQUID ORAL at 11:00

## 2019-01-01 RX ADMIN — PIPERACILLIN AND TAZOBACTAM 3.38 G: 3; .375 INJECTION, POWDER, LYOPHILIZED, FOR SOLUTION INTRAVENOUS at 12:12

## 2019-01-01 RX ADMIN — VASOPRESSIN 0.4 UNITS/MIN: 20 INJECTION INTRAVENOUS at 17:17

## 2019-01-01 RX ADMIN — ALBUMIN (HUMAN) 25 G: 0.25 INJECTION, SOLUTION INTRAVENOUS at 04:29

## 2019-01-01 RX ADMIN — PIPERACILLIN AND TAZOBACTAM 3.38 G: 3; .375 INJECTION, POWDER, LYOPHILIZED, FOR SOLUTION INTRAVENOUS at 17:53

## 2019-01-01 RX ADMIN — MIDAZOLAM 2 MG: 1 INJECTION INTRAMUSCULAR; INTRAVENOUS at 16:45

## 2019-01-01 RX ADMIN — SODIUM CHLORIDE, SODIUM ACETATE ANHYDROUS, SODIUM GLUCONATE, POTASSIUM CHLORIDE, AND MAGNESIUM CHLORIDE 1000 ML: 526; 222; 502; 37; 30 INJECTION, SOLUTION INTRAVENOUS at 16:13

## 2019-01-01 RX ADMIN — CHLORHEXIDINE GLUCONATE 0.12% ORAL RINSE 10 ML: 1.2 LIQUID ORAL at 08:37

## 2019-01-01 RX ADMIN — ALBUMIN (HUMAN) 25 G: 0.25 INJECTION, SOLUTION INTRAVENOUS at 23:40

## 2019-01-01 RX ADMIN — CALCIUM GLUCONATE 2 G: 98 INJECTION, SOLUTION INTRAVENOUS at 13:34

## 2019-01-01 RX ADMIN — Medication 10 ML: at 22:00

## 2019-01-01 RX ADMIN — VASOPRESSIN 0.4 UNITS/MIN: 20 INJECTION INTRAVENOUS at 10:08

## 2019-01-01 RX ADMIN — MAGNESIUM SULFATE 2 G: 2 INJECTION INTRAVENOUS at 23:55

## 2019-01-01 RX ADMIN — VASOPRESSIN 0.4 UNITS/MIN: 20 INJECTION INTRAVENOUS at 18:33

## 2019-01-01 RX ADMIN — Medication 50 MCG/HR: at 03:39

## 2019-01-01 RX ADMIN — SODIUM CHLORIDE 1000 ML: 900 INJECTION, SOLUTION INTRAVENOUS at 17:42

## 2019-01-01 RX ADMIN — OCTREOTIDE ACETATE 50 MCG/HR: 500 INJECTION, SOLUTION INTRAVENOUS; SUBCUTANEOUS at 04:45

## 2019-01-01 RX ADMIN — SODIUM CHLORIDE 1000 ML: 900 INJECTION, SOLUTION INTRAVENOUS at 17:45

## 2019-01-01 RX ADMIN — Medication 5.33 MCG/MIN: at 08:10

## 2019-01-01 RX ADMIN — FOLIC ACID: 5 INJECTION, SOLUTION INTRAMUSCULAR; INTRAVENOUS; SUBCUTANEOUS at 16:24

## 2019-01-01 RX ADMIN — ONDANSETRON 4 MG: 2 INJECTION INTRAMUSCULAR; INTRAVENOUS at 03:50

## 2019-01-01 RX ADMIN — Medication 50 MCG/HR: at 12:02

## 2019-01-01 RX ADMIN — OCTREOTIDE ACETATE 50 MCG/HR: 500 INJECTION, SOLUTION INTRAVENOUS; SUBCUTANEOUS at 08:08

## 2019-01-01 RX ADMIN — CALCIUM GLUCONATE 2 G: 98 INJECTION, SOLUTION INTRAVENOUS at 13:00

## 2019-01-01 RX ADMIN — SODIUM CHLORIDE 8 MG/HR: 9 INJECTION, SOLUTION INTRAVENOUS at 21:49

## 2019-01-01 RX ADMIN — VASOPRESSIN 0.4 UNITS/MIN: 20 INJECTION INTRAVENOUS at 12:10

## 2019-01-01 RX ADMIN — VASOPRESSIN 0.4 UNITS/MIN: 20 INJECTION INTRAVENOUS at 11:05

## 2019-01-01 RX ADMIN — PANTOPRAZOLE SODIUM 8 MG/HR: 40 INJECTION, POWDER, FOR SOLUTION INTRAVENOUS at 06:38

## 2019-01-01 RX ADMIN — EPINEPHRINE 2 MCG/MIN: 1 INJECTION INTRAMUSCULAR; INTRAVENOUS; SUBCUTANEOUS at 22:28

## 2019-01-01 RX ADMIN — MIDAZOLAM HYDROCHLORIDE 2 MG: 1 INJECTION, SOLUTION INTRAMUSCULAR; INTRAVENOUS at 16:45

## 2019-01-01 RX ADMIN — MIDAZOLAM HYDROCHLORIDE 2 MG/HR: 5 INJECTION, SOLUTION INTRAMUSCULAR; INTRAVENOUS at 16:37

## 2019-01-01 RX ADMIN — DEXTROSE MONOHYDRATE: 5 INJECTION, SOLUTION INTRAVENOUS at 04:03

## 2019-01-01 RX ADMIN — FAMOTIDINE 20 MG: 10 INJECTION INTRAVENOUS at 14:16

## 2019-01-01 RX ADMIN — OCTREOTIDE ACETATE 50 MCG/HR: 500 INJECTION, SOLUTION INTRAVENOUS; SUBCUTANEOUS at 17:53

## 2019-01-01 RX ADMIN — Medication 50 MEQ: at 08:12

## 2019-01-01 RX ADMIN — ALBUMIN (HUMAN) 25 G: 0.25 INJECTION, SOLUTION INTRAVENOUS at 04:00

## 2019-01-01 RX ADMIN — SODIUM CHLORIDE 40 MG: 9 INJECTION, SOLUTION INTRAMUSCULAR; INTRAVENOUS; SUBCUTANEOUS at 17:24

## 2019-01-01 RX ADMIN — SODIUM CHLORIDE 8 MG/HR: 9 INJECTION, SOLUTION INTRAVENOUS at 07:35

## 2019-01-01 RX ADMIN — Medication 30 MCG/MIN: at 08:02

## 2019-01-01 RX ADMIN — VASOPRESSIN 0.4 UNITS/MIN: 20 INJECTION INTRAVENOUS at 15:42

## 2019-01-01 RX ADMIN — DEXTROSE MONOHYDRATE: 5 INJECTION, SOLUTION INTRAVENOUS at 17:53

## 2019-01-01 RX ADMIN — MAGNESIUM SULFATE HEPTAHYDRATE 2 G: 40 INJECTION, SOLUTION INTRAVENOUS at 17:56

## 2019-01-01 RX ADMIN — POTASSIUM CHLORIDE 10 MEQ: 200 INJECTION, SOLUTION INTRAVENOUS at 10:25

## 2019-01-01 RX ADMIN — VASOPRESSIN 0.4 UNITS/MIN: 20 INJECTION INTRAVENOUS at 12:30

## 2019-01-22 NOTE — PROGRESS NOTES
Gumaro Johansen presents today for   Chief Complaint   Patient presents with    Follow-up     6 month     Shortness of Breath     on exertion       Gumaro Johansen preferred language for health care discussion is english/other. Is someone accompanying this pt? No    Is the patient using any DME equipment during OV? No    Depression Screening:  No flowsheet data found. Learning Assessment:  No flowsheet data found. Abuse Screening:  No flowsheet data found. Fall Risk  No flowsheet data found. Pt currently taking Antiplatelet therapy? No    Coordination of Care:  1. Have you been to the ER, urgent care clinic since your last visit? Hospitalized since your last visit? No    2. Have you seen or consulted any other health care providers outside of the [address] [place] [address] since your last visit? Include any pap smears or colon screening.  No

## 2019-01-22 NOTE — PROGRESS NOTES
HISTORY OF PRESENT ILLNESS  Cassandra Olea is a 59 y.o. male. HPI  He has been feeling quite well. He denies any cardiac symptoms. However, he indicates that his girlfriend noted that he does have some shortness of breath with exertion. He states that if he carries a heavy box for a few flights of stairs, he might get short of breath. He does not have any shortness of breath with day to day activities. He denies resting dyspnea, orthopnea, PND. He does walk his dog in the afternoon for 40-45 minutes without difficulty. He has had no chest pain. He denies palpitation, dizziness or syncope. He denies any symptoms of TIA or amaurosis fugax. He was told that he had a heart murmur and apparently had an echocardiogram for which the report is not available at this time. He had a Holter monitor which demonstrated baseline sinus rhythm with occasional PAC's and PVC 's and bradycardia, but no significant bradycardia. His heart rate apparently ranged from 49 bpm to 94 bpm. There was no evidence of atrial fibrillation.      He  Is a nonsmoker. He has no history of diabetes mellitus. He indicates that at time his sugar tends to be too low. He has been told his cholesterol profile has been good. He denies family history of coronary artery disease.      We finally obtained a copy of his echocardiogram that was done at Dr. Belén Adame office which reportedly demonstrated a normal echocardiographic examination. The LV function was reportedly normal and there was no evidence of significant valvular pathology. His ejection fraction was in the 60-65% range with normal diastolic filling pattern. The left atrial dimension was normal. There was no evidence of pulmonary hypertension. Review of Systems   Constitutional: Negative for malaise/fatigue and weight loss. HENT: Negative for hearing loss. Eyes: Negative for blurred vision and double vision. Respiratory: Positive for shortness of breath.     Cardiovascular: Negative for chest pain, palpitations, orthopnea, claudication, leg swelling and PND. Gastrointestinal: Negative for blood in stool, heartburn and melena. Genitourinary: Negative for dysuria, frequency, hematuria and urgency. Musculoskeletal: Negative for back pain and joint pain. Skin: Negative for itching and rash. Neurological: Negative for dizziness, loss of consciousness and weakness. Psychiatric/Behavioral: Negative for depression and memory loss. Physical Exam   Constitutional: He is oriented to person, place, and time. He appears well-developed and well-nourished. HENT:   Head: Normocephalic and atraumatic. Eyes: Conjunctivae are normal. Pupils are equal, round, and reactive to light. Neck: Normal range of motion. Neck supple. No JVD present. Cardiovascular: Normal rate, regular rhythm, S1 normal and S2 normal.  No extrasystoles are present. PMI is not displaced. Exam reveals no gallop and no friction rub. Murmur heard. Harsh early systolic murmur is present with a grade of 2/6 at the upper right sternal border radiating to the neck. Pulses:       Carotid pulses are 3+ on the right side with bruit, and 3+ on the left side with bruit. Pulmonary/Chest: Effort normal. He has no rales. Abdominal: Soft. There is no tenderness. Musculoskeletal: He exhibits no edema. Neurological: He is alert and oriented to person, place, and time. No cranial nerve deficit. Skin: Skin is warm and dry. Psychiatric: He has a normal mood and affect.  His behavior is normal.     Visit Vitals  /80   Pulse 69   Ht 5' 8\" (1.727 m)   Wt 108.9 kg (240 lb)   SpO2 98%   BMI 36.49 kg/m²       Past Medical History:   Diagnosis Date    Alcohol abuse, unspecified     Bradycardia     Cirrhosis of liver (HCC)     Colon polyps     Dietary surveillance and counseling     Heart murmur     Hepatitis C     Treated    Holter monitor, abnormal 01/2018    Hypertension     PAC (premature atrial contraction)     Psoriasis     PVC's (premature ventricular contractions)     Sleep apnea     no cpap    Special screening for malignant neoplasms, colon     Subdural hematoma ([facility] [address] 2016    from a fall 2015    Unspecified disorder of penis        Social History     Socioeconomic History    Marital status:      Spouse name: Not on file    Number of children: Not on file    Years of education: Not on file    Highest education level: Not on file   Social Needs    Financial resource strain: Not on file    Food insecurity - worry: Not on file    Food insecurity - inability: Not on file   [employer] needs - medical: Not on file   [employer] needs - non-medical: Not on file   Occupational History    Not on file   Tobacco Use    Smoking status: Former Smoker     Packs/day: 0.50     Years: 4.00     Pack years: 2.00     Last attempt to quit: 2001     Years since quittin.0    Smokeless tobacco: Never Used   Substance and Sexual Activity    Alcohol use: Yes     Alcohol/week: 0.0 oz     Comment: 2018    Drug use: Yes     Types: Marijuana, Cocaine     Comment: -last used    Sexual activity: Not on file   Other Topics Concern    Not on file   Social History Narrative    Not on file       Family History   Problem Relation Age of Onset    Bipolar Disorder Mother     Alcohol abuse Mother     Heart Disease Father     Suicide Brother        Past Surgical History:   Procedure Laterality Date    COLONOSCOPY N/A 2016    COLONOSCOPY WITH HOT SNARE POLYPECTOMY performed by Kristen Deleon MD at [address] HX CATARACT REMOVAL      HX CRANIOTOMY  1/3/2016    subdural hematoma    HX HERNIA REPAIR         Current Outpatient Medications   Medication Sig Dispense Refill    tamsulosin (FLOMAX) 0.4 mg capsule Take 1 Cap by mouth daily (after dinner).  Indications: benign prostatic hyperplasia with lower urinary tract sx 90 Cap 3    sildenafil citrate (VIAGRA) 100 mg tablet Take 0.5 Tabs by mouth daily as needed for up to 10 doses. 18 Tab 3    Ustekinumab (USTEKINAUMAB) 90 mg/mL injection by SubCUTAneous route. Every 13 weeks      therapeutic multivitamin (THERAGRAN) tablet Take 1 Tab by mouth daily. Patient may take any OTC MVI 30 Tab 0    amLODIPine (NORVASC) 10 mg tablet Take 10 mg by mouth daily.  cloNIDine HCl (CATAPRES) 0.1 mg tablet Take 0.1 mg by mouth three (3) times daily.  hydrOXYzine (ATARAX) 25 mg tablet Take 25 mg by mouth three (3) times daily as needed for Itching. EKG: normal EKG, normal sinus rhythm, unchanged from previous tracings, nonspecific ST and T waves changes, rotation of the heart  . ASSESSMENT and PLAN  Encounter Diagnoses   Name Primary?  Aortic valve sclerosis Yes    Hypertension, unspecified type     Bradycardia     Bruit    He has been doing well. He has had no symptoms to indicate angina or cardiac decompensation. His mild dyspnea on exertion seems to be related to his obesity and deconditioning. He does not feel much shortness of breath with normal activities. His aortic valve murmur has not changed much and is consistent with either mild aortic stenosis or sclerotic aortic valve. There appears to be no indication for repeat echocardiogram. He does have transmitted murmur to both carotid arteries which makes it difficult to distinguish from the carotid bruit and at this time I would proceed with noninvasive carotid study.

## 2019-02-20 NOTE — PROGRESS NOTES
Per your last note'   He does have transmitted murmur to both carotid arteries which makes it difficult to distinguish from the carotid bruit and at this time I would proceed with noninvasive carotid study.

## 2019-02-21 NOTE — TELEPHONE ENCOUNTER
----- Message from Cornelia Noyola MD sent at 2/20/2019  2:50 PM EST -----  Called him  ----- Message -----  From: Janet Drake LPN  Sent: 2/91/6765   9:49 AM  To: Cornelia Noyola MD    Per your last note'   He does have transmitted murmur to both carotid arteries which makes it difficult to distinguish from the carotid bruit and at this time I would proceed with noninvasive carotid study.

## 2019-05-30 NOTE — ANESTHESIA PREPROCEDURE EVALUATION
Anesthetic History               Review of Systems / Medical History  Patient summary reviewed and pertinent labs reviewed    Pulmonary        Sleep apnea           Neuro/Psych   Within defined limits           Cardiovascular    Hypertension        Dysrhythmias       Exercise tolerance: >4 METS     GI/Hepatic/Renal       Hepatitis: type C    Liver disease     Endo/Other        Obesity     Other Findings              Physical Exam    Airway  Mallampati: II  TM Distance: 4 - 6 cm  Neck ROM: decreased range of motion   Mouth opening: Diminished (comment)     Cardiovascular    Rhythm: irregular  Rate: normal         Dental    Dentition: Poor dentition     Pulmonary  Breath sounds clear to auscultation               Abdominal  GI exam deferred       Other Findings            Anesthetic Plan    ASA: 3  Anesthesia type: MAC            Anesthetic plan and risks discussed with: Patient

## 2019-05-30 NOTE — DISCHARGE INSTRUCTIONS
Upper GI Endoscopy: What to Expect at [address] [place] [address]  After you have an endoscopy, you will stay at the hospital or clinic for 1 to 2 hours. This will allow the medicine to wear off. You will be able to go home after your doctor or nurse checks to make sure you are not having any problems. You may have to stay overnight if you had treatment during the test. You may have a sore throat for a day or two after the test.  This care sheet gives you a general idea about what to expect after the test.  How can you care for yourself at home? Activity  · Rest as much as you need to after you go home. · You should be able to go back to your usual activities the day after the test.  Diet  · Follow your doctor's directions for eating after the test.  · Drink plenty of fluids (unless your doctor has told you not to). Medications  · If you have a sore throat the day after the test, use an over-the-counter spray to numb your throat. Follow-up care is a key part of your treatment and safety. Be sure to make and go to all appointments, and call your doctor if you are having problems. It's also a good idea to know your test results and keep a list of the medicines you take. When should you call for help? Call 911 anytime you think you may need emergency care. For example, call if:  · You passed out (lost consciousness). · You cough up blood. · You vomit blood or what looks like coffee grounds. · You pass maroon or very bloody stools. Call your doctor now or seek immediate medical care if:  · You have trouble swallowing. · You have belly pain. · Your stools are black and tarlike or have streaks of blood. · You are sick to your stomach or cannot keep fluids down. Watch closely for changes in your health, and be sure to contact your doctor if:  · Your throat still hurts after a day or two. · You do not get better as expected. Where can you learn more?    Go to DealExplorer.be  Enter J454 in the search box to learn more about \"Upper GI Endoscopy: What to Expect at Home. \"   © 5014-2466 Healthwise, Incorporated. Care instructions adapted under license by [facility] [employer] (which disclaims liability or warranty for this information). This care instruction is for use with your licensed healthcare professional. If you have questions about a medical condition or this instruction, always ask your healthcare professional. [address] any warranty or liability for your use of this information. Content Version: 41.1.155669; Current as of: November 14, 2014    Colonoscopy: What to Expect at [address] [facility]  After you have a colonoscopy, you will stay at the clinic for 1 to 2 hours until the medicines wear off. Then you can go home. But you will need to arrange for a ride. Your doctor will tell you when you can eat and do your other usual activities. Your doctor will talk to you about when you will need your next colonoscopy. Your doctor can help you decide how often you need to be checked. This will depend on the results of your test and your risk for colorectal cancer. After the test, you may be bloated or have gas pains. You may need to pass gas. If a biopsy was done or a polyp was removed, you may have streaks of blood in your stool (feces) for a few days. This care sheet gives you a general idea about how long it will take for you to recover. But each person recovers at a different pace. Follow the steps below to get better as quickly as possible. How can you care for yourself at home? Activity  · Rest when you feel tired. · You can do your normal activities when it feels okay to do so. Diet  · Follow your doctor's directions for eating. · Unless your doctor has told you not to, drink plenty of fluids. This helps to replace the fluids that were lost during the colon prep. · Do not drink alcohol.   Medicines  · If polyps were removed or a biopsy was done during the test, your doctor may tell you not to take aspirin or other anti-inflammatory medicines for a few days. These include ibuprofen (Advil, Motrin) and naproxen (Aleve). Other instructions  · For your safety, do not drive or operate machinery until the medicine wears off and you can think clearly. Your doctor may tell you not to drive or operate machinery until the day after your test.  · Do not sign legal documents or make major decisions until the medicine wears off and you can think clearly. The anesthesia can make it hard for you to fully understand what you are agreeing to. Follow-up care is a key part of your treatment and safety. Be sure to make and go to all appointments, and call your doctor if you are having problems. It's also a good idea to know your test results and keep a list of the medicines you take. When should you call for help? Call 911 anytime you think you may need emergency care. For example, call if:  · You passed out (lost consciousness). · You pass maroon or bloody stools. · You have severe belly pain. Call your doctor now or seek immediate medical care if:  · Your stools are black and tarlike. · Your stools have streaks of blood, but you did not have a biopsy or any polyps removed. · You have belly pain, or your belly is swollen and firm. · You vomit. · You have a fever. · You are very dizzy. Watch closely for changes in your health, and be sure to contact your doctor if you have any problems. Where can you learn more? Go to IBUonline.be  Enter E264 in the search box to learn more about \"Colonoscopy: What to Expect at Home. \"   © 2076-1074 Healthwise, Incorporated. Care instructions adapted under license by [employer] (which disclaims liability or warranty for this information).  This care instruction is for use with your licensed healthcare professional. If you have questions about a medical condition or this instruction, always ask your healthcare professional. Healthwise, Incorporated disclaims any warranty or liability for your use of this information. Content Version: 76.1.240081; Current as of: November 14, 2014  Patient Education        Colon Polyps: Care Instructions  Your Care Instructions    Colon polyps are growths in the colon or the rectum. The cause of most colon polyps is not known, and most people who get them do not have any problems. But a certain kind can turn into cancer. For this reason, regular testing for colon polyps is important for people age 48 and older and anyone who has an increased risk for colon cancer. Polyps are usually found through routine colon cancer screening tests. Although most colon polyps are not cancerous, they are usually removed and then tested for cancer. Screening for colon cancer saves lives because the cancer can usually be cured if it is caught early. If you have a polyp that is the type that can turn into cancer, you may need more tests to examine your entire colon. The doctor will remove any other polyps that he or she finds, and you will be tested more often. Follow-up care is a key part of your treatment and safety. Be sure to make and go to all appointments, and call your doctor if you are having problems. It's also a good idea to know your test results and keep a list of the medicines you take. How can you care for yourself at home? Regular exams to look for colon polyps are the best way to prevent polyps from turning into colon cancer. These can include stool tests, sigmoidoscopy, colonoscopy, and CT colonography. Talk with your doctor about a testing schedule that is right for you. To prevent polyps  There is no home treatment that can prevent colon polyps. But these steps may help lower your risk for cancer. · Stay active. Being active can help you get to and stay at a healthy weight. Try to exercise on most days of the week. Walking is a good choice. · Eat well.  Choose a variety of vegetables, fruits, legumes (such as peas and beans), fish, poultry, and whole grains. · Do not smoke. If you need help quitting, talk to your doctor about stop-smoking programs and medicines. These can increase your chances of quitting for good. · If you drink alcohol, limit how much you drink. Limit alcohol to 2 drinks a day for men and 1 drink a day for women. When should you call for help? Call your doctor now or seek immediate medical care if:    · You have severe belly pain.     · Your stools are maroon or very bloody.    Watch closely for changes in your health, and be sure to contact your doctor if:    · You have a fever.     · You have nausea or vomiting.     · You have a change in bowel habits (new constipation or diarrhea).     · Your symptoms get worse or are not improving as expected. Where can you learn more? Go to http://guillermo-nancy.info/. Enter 95 [id number] in the search box to learn more about \"Colon Polyps: Care Instructions. \"  Current as of: March 27, 2018  Content Version: 11.9  © 2914-3176 [employer]. Care instructions adapted under license by [employer] (which disclaims liability or warranty for this information). If you have questions about a medical condition or this instruction, always ask your healthcare professional. [address] any warranty or liability for your use of this information. DISCHARGE SUMMARY from Nurse     POST-PROCEDURE INSTRUCTIONS:    Call your Physician if you:  ? Observe any excess bleeding. ? Develop a temperature over 100.5o F.  ? Experience abdominal, shoulder or chest pain. ? Notice any signs of decreased circulation or nerve impairment to an extremity such as a change in color, persistent numbness, tingling, coldness or increase in pain. ? Vomit blood or you have nausea and vomiting lasting longer than 4 hours. ? Are unable to take medications.   ? Are unable to urinate within 8 hours after discharge following general anesthesia or intravenous sedation. For the next 24 hours after receiving general anesthesia or intravenous sedation, or while taking prescription Narcotics, limit your activities:  ? Do NOT drive a motor vehicle, operate hazard machinery or power tools, or perform tasks that require coordination. The medication you received during your procedure may have some effect on your mental awareness. ? Do NOT make important personal or business decisions. The medication you received during your procedure may have some effect on your mental awareness. ? Do NOT drink alcoholic beverages. These drinks do not mix well with the medications that have been given to you. ? Upon discharge from the hospital, you must be accompanied by a responsible adult. ? Resume your diet as directed by your physician. ? Resume medications as your physician has prescribed. ? Please give a list of your current medications to your Primary Care Provider. ? Please update this list whenever your medications are discontinued, doses are changed, or new medications (including over-the-counter products) are added. ? Please carry medication information at all times in case of emergency situations. These are general instructions for a healthy lifestyle:    No smoking/ No tobacco products/ Avoid exposure to second hand smoke.  Surgeon General's Warning:  Quitting smoking now greatly reduces serious risk to your health. Obesity, smoking, and a sedentary lifestyle greatly increase your risk for illness.  A healthy diet, regular physical exercise & weight monitoring are important for maintaining a healthy lifestyle   You may be retaining fluid if you have a history of heart failure or if you experience any of the following symptoms:  Weight gain of 3 pounds or more overnight or 5 pounds in a week, increased swelling in our hands or feet or shortness of breath while lying flat in bed.   Please call your doctor as soon as you notice any of these symptoms; do not wait until your next office visit. Recognize signs and symptoms of STROKE:  F  -  Face looks uneven  A  -  Arms unable to move or move unevenly  S  -  Speech slurred or non-existent  T  -  Time to call 911 - as soon as signs and symptoms begin - DO NOT go back to bed or wait to see If you get better - TIME IS BRAIN. Colorectal Screening   Colorectal cancer almost always develops from precancerous polyps (abnormal growths) in the colon or rectum. Screening tests can find precancerous polyps, so that they can be removed before they turn into cancer. Screening tests can also find colorectal cancer early, when treatment works best.  [place] Norma Speak with your physician about when you should begin screening and how often you should be tested. Additional Information    If you have questions, please call [phone]. Remember, [employer] is NOT to be used for urgent needs. For medical emergencies, dial 911. Educational references and/or instructions provided during this visit included:    Colon Polyps      APPOINTMENTS:    Please make a follow-up appointment with your physician. Discharge information has been reviewed with the patient. The patient verbalized understanding.

## 2019-05-30 NOTE — ANESTHESIA POSTPROCEDURE EVALUATION
Procedure(s):  UPPER ENDOSCOPY  COLONOSCOPY / polypectomy.     MAC    Anesthesia Post Evaluation      Multimodal analgesia: multimodal analgesia used between 6 hours prior to anesthesia start to PACU discharge  Patient location during evaluation: bedside  Patient participation: complete - patient participated  Level of consciousness: awake  Pain management: adequate  Airway patency: patent  Anesthetic complications: no  Cardiovascular status: stable  Respiratory status: acceptable  Hydration status: acceptable  Post anesthesia nausea and vomiting:  controlled      Vitals Value Taken Time   /69 5/30/2019  3:48 PM   Temp 36.7 °C (98 °F) 5/30/2019  3:48 PM   Pulse 81 5/30/2019  3:48 PM   Resp 20 5/30/2019  3:48 PM   SpO2 97 % 5/30/2019  3:48 PM

## 2019-05-30 NOTE — PERIOP NOTES
Patient ID band removed and placed in shredder box. Patient left ambulatory with steady gait and friend to drive. No complaints or distress noted.

## 2019-08-14 NOTE — PROGRESS NOTES
HISTORY OF PRESENT ILLNESS Waylon Oppenheim is a 62 y.o. male. HPI He has been doing very well. He has no cardiac complaints. He does have mild dyspnea on overexertion but not with daily activities. He denies chest pain, resting dyspnea, orthopnea, PND. He denies palpitations, dizziness or syncope. He has had no symptoms to indicate TIA or amaurosis fugax. He underwent noninvasive carotid study on 2/19/19 which demonstrated < 50% stenosis on the internal carotid arteries bilaterally. He was told that he had a heart murmur and apparently had an echocardiogram . He had a Holter monitor which demonstrated baseline sinus rhythm with occasional PAC's and PVC 's and bradycardia, but no significant bradycardia. His heart rate apparently ranged from 49 bpm to 94 bpm. There was no evidence of atrial fibrillation.  
  
He  Is a nonsmoker. He has no history of diabetes mellitus. He indicates that at time his sugar tends to be too low. He has been told his cholesterol profile has been good. He denies family history of coronary artery disease.  
  
We finally obtained a copy of his echocardiogram that was done at Dr. Ramo Patel office which reportedly demonstrated a normal echocardiographic examination. The LV function was reportedly normal and there was no evidence of significant valvular pathology. His ejection fraction was in the 60-65% range with normal diastolic filling pattern. The left atrial dimension was normal. There was no evidence of pulmonary hypertension.  
 
Review of Systems Constitutional: Negative for malaise/fatigue and weight loss. HENT: Negative for hearing loss. Eyes: Negative for blurred vision and double vision. Respiratory: Positive for shortness of breath. Cardiovascular: Negative for chest pain, palpitations, orthopnea, claudication, leg swelling and PND. Gastrointestinal: Negative for blood in stool, heartburn and melena. Genitourinary: Negative for dysuria, frequency, hematuria and urgency. Musculoskeletal: Negative for back pain and joint pain. Skin: Negative for itching and rash. Neurological: Negative for dizziness and loss of consciousness. Psychiatric/Behavioral: Negative for depression and memory loss. Physical Exam  
Constitutional: He is oriented to person, place, and time. He appears well-developed and well-nourished. HENT:  
Head: Normocephalic and atraumatic. Eyes: Pupils are equal, round, and reactive to light. Conjunctivae are normal.  
Neck: Normal range of motion. Neck supple. No JVD present. Cardiovascular: Normal rate, regular rhythm, S1 normal and S2 normal.  No extrasystoles are present. PMI is not displaced. Exam reveals no gallop and no friction rub. Murmur heard. Harsh early systolic murmur is present with a grade of 1/6 at the upper right sternal border. Pulses: 
     Carotid pulses are 3+ on the right side, and 3+ on the left side. Pulmonary/Chest: Effort normal. He has no rales. Abdominal: Soft. There is no tenderness. Musculoskeletal: He exhibits no edema. Neurological: He is alert and oriented to person, place, and time. No cranial nerve deficit. Skin: Skin is warm and dry. Psychiatric: He has a normal mood and affect. His behavior is normal.  
 
Visit Vitals /72 Pulse 63 Ht 5' 8\" (1.727 m) Wt 104.3 kg (230 lb) SpO2 96% BMI 34.97 kg/m² Past Medical History:  
Diagnosis Date  Alcohol abuse, unspecified  BPH (benign prostatic hyperplasia)  Bradycardia  Cirrhosis of liver ([facility] [address]  Colon polyps  Dietary surveillance and counseling  ED (erectile dysfunction)  Heart murmur  Hepatitis C Treated  Holter monitor, abnormal 01/2018  Hypertension  PAC (premature atrial contraction)  Psoriasis  PVC's (premature ventricular contractions)  Sleep apnea   
 no cpap  Special screening for malignant neoplasms, colon  Subdural hematoma ([facility] [address] 01/03/2016  
 from a fall 8/2015  Unspecified disorder of penis Social History Socioeconomic History  Marital status:  Spouse name: Not on file  Number of children: Not on file  Years of education: Not on file  Highest education level: Not on file Occupational History  Not on file Social Needs  Financial resource strain: Not on file  Food insecurity:  
  Worry: Not on file Inability: Not on file  Transportation needs:  
  Medical: Not on file Non-medical: Not on file Tobacco Use  Smoking status: Never Smoker  Smokeless tobacco: Never Used Substance and Sexual Activity  Alcohol use: Not Currently Alcohol/week: 0.0 standard drinks Comment: 9/2018  Drug use: Yes Types: Marijuana, Cocaine Comment: 2003-last used  Sexual activity: Not on file Lifestyle  Physical activity:  
  Days per week: Not on file Minutes per session: Not on file  Stress: Not on file Relationships  Social connections:  
  Talks on phone: Not on file Gets together: Not on file Attends Christianity service: Not on file Active member of club or organization: Not on file Attends meetings of clubs or organizations: Not on file Relationship status: Not on file  Intimate partner violence:  
  Fear of current or ex partner: Not on file Emotionally abused: Not on file Physically abused: Not on file Forced sexual activity: Not on file Other Topics Concern  Not on file Social History Narrative  Not on file Family History Problem Relation Age of Onset  Bipolar Disorder Mother  Alcohol abuse Mother  Heart Disease Father  Suicide Brother Past Surgical History:  
Procedure Laterality Date  COLONOSCOPY N/A 5/23/2016  COLONOSCOPY WITH HOT SNARE POLYPECTOMY performed by Nicole Vo MD at [facility] ENDOSCOPY  COLONOSCOPY N/A 5/30/2019 COLONOSCOPY / polypectomy performed by Claudia Lopez MD at [facility] ENDOSCOPY  
 HX CATARACT REMOVAL    
 HX CRANIOTOMY  1/3/2016  
 subdural hematoma  HX HERNIA REPAIR Current Outpatient Medications Medication Sig Dispense Refill  ergocalciferol (ERGOCALCIFEROL) 50,000 unit capsule 50,000 Units every seven (7) days.  folic acid (FOLVITE) 1 mg tablet TK 1 T PO  D  0  
 potassium chloride SR (K-TAB) 20 mEq tablet 40 mEq daily.  tamsulosin (FLOMAX) 0.4 mg capsule Take 1 Cap by mouth daily (after dinner). 90 Cap 3  
 sildenafil citrate (VIAGRA) 100 mg tablet Take 1 Tab by mouth daily as needed (ED) for up to 1 dose. 18 Tab 3  
 Ustekinumab (USTEKINAUMAB) 90 mg/mL injection by SubCUTAneous route. Every 13 weeks  therapeutic multivitamin (THERAGRAN) tablet Take 1 Tab by mouth daily. Patient may take any OTC MVI 30 Tab 0  
 amLODIPine (NORVASC) 10 mg tablet Take 10 mg by mouth daily.  cloNIDine HCl (CATAPRES) 0.1 mg tablet Take 0.1 mg by mouth three (3) times daily.  hydrOXYzine (ATARAX) 25 mg tablet Take 25 mg by mouth three (3) times daily as needed for Itching.  gabapentin (NEURONTIN) 300 mg capsule EKG: normal EKG, normal sinus rhythm, unchanged from previous tracings, left axis deviation, rotation of the heart Genesis Maciel ASSESSMENT and PLAN Encounter Diagnoses Name Primary?  Aortic valve sclerosis Yes  Hypertension, unspecified type  Bradycardia  Bruit He has been doing very well. He has had no symptoms to indicate angina or cardiac decompensation. He has had no dizziness or syncope. The aortic valve murmur has not changed much and is consistent with the sclerotic aortic valve with no significant aortic stenosis. He does not have significant stenosis of the internal carotid arteries bilaterally. His blood pressure has been under control. For now, continue on current medical regimen.

## 2019-08-14 NOTE — PROGRESS NOTES
Abdelrahman Polk presents today for Chief Complaint Patient presents with  Slow Heart Rate 6 month  Shortness of Breath  
  with exertion Abdelrahman Polk preferred language for health care discussion is english/other. Is someone accompanying this pt? no 
 
Is the patient using any DME equipment during [address]? no 
 
Depression Screening: 
3 most recent PHQ Screens 8/14/2019 Little interest or pleasure in doing things Not at all Feeling down, depressed, irritable, or hopeless Not at all Total Score PHQ 2 0 Learning Assessment: 
Learning Assessment 8/14/2019 PRIMARY LEARNER Patient BARRIERS PRIMARY LEARNER NONE  
CO-LEARNER CAREGIVER No  
PRIMARY LANGUAGE ENGLISH  
LEARNER PREFERENCE PRIMARY READING  
ANSWERED BY patient RELATIONSHIP SELF Abuse Screening: 
Abuse Screening Questionnaire 8/14/2019 Do you ever feel afraid of your partner? Adriel Raghu Are you in a relationship with someone who physically or mentally threatens you? Adriel Krishnamurthy Is it safe for you to go home? Chino Liu Fall Risk Fall Risk Assessment, last 12 mths 8/14/2019 Able to walk? Yes Fall in past 12 months? No  
 
 
Pt currently taking Anticoagulant therapy? no 
 
Coordination of Care: 1. Have you been to the ER, urgent care clinic since your last visit? Hospitalized since your last visit? no 
 
2. Have you seen or consulted any other health care providers outside of the [address] [place] [address] since your last visit? Include any pap smears or colon screening.  no

## 2019-11-08 PROBLEM — N39.0 UTI (URINARY TRACT INFECTION): Status: ACTIVE | Noted: 2019-01-01

## 2019-11-08 PROBLEM — R18.8 ASCITES: Status: ACTIVE | Noted: 2019-01-01

## 2019-11-08 PROBLEM — A41.9 SEPSIS (HCC): Status: ACTIVE | Noted: 2019-01-01

## 2019-11-08 NOTE — ED PROVIDER NOTES
EMERGENCY DEPARTMENT HISTORY AND PHYSICAL EXAM 
 
2:05 PM 
 
 
Date: 11/8/2019 Patient Name: Anahy Mar History of Presenting Illness Chief Complaint Patient presents with  Dizziness  Melena History Provided By: Patient Additional History (Context): Anahy Mar is a 62 y.o. male with hypertension and Dural hematoma, liver cirrhosis who presents with GI bleed. Patient states he saw his GI specialist last week. He had another episode of rectal bleeding. He states it is not right red. Patient has been weak. Patient denies chest pain, nausea, vomiting or diarrhea. PCP: None Current Facility-Administered Medications Medication Dose Route Frequency Provider Last Rate Last Dose  pantoprazole (PROTONIX) 40 mg in 0.9% sodium chloride (MBP/ADV) 50 mL MBP  8 mg/hr IntraVENous CONTINUOUS Praful Kennedy DO 10 mL/hr at 11/08/19 1728 8 mg/hr at 11/08/19 1728  sodium chloride (NS) flush 5-10 mL  5-10 mL IntraVENous PRN Praful Kennedy DO      
 piperacillin-tazobactam (ZOSYN) 3.375 g in 0.9% sodium chloride (MBP/ADV) 100 mL MBP  3.375 g IntraVENous Q6H Praful Kennedy  mL/hr at 11/08/19 1732 3.375 g at 11/08/19 1732  sodium chloride 0.9 % bolus infusion 40 mL  40 mL IntraVENous ONCE Lauren Kennedy DO      
 
Current Outpatient Medications Medication Sig Dispense Refill  ergocalciferol (ERGOCALCIFEROL) 50,000 unit capsule 50,000 Units every seven (7) days.  folic acid (FOLVITE) 1 mg tablet TK 1 T PO  D  0  
 gabapentin (NEURONTIN) 300 mg capsule  potassium chloride SR (K-TAB) 20 mEq tablet 40 mEq daily.  tamsulosin (FLOMAX) 0.4 mg capsule Take 1 Cap by mouth daily (after dinner). 90 Cap 3  
 sildenafil citrate (VIAGRA) 100 mg tablet Take 1 Tab by mouth daily as needed (ED) for up to 1 dose. 18 Tab 3  
 Ustekinumab (USTEKINAUMAB) 90 mg/mL injection by SubCUTAneous route. Every 13 weeks  therapeutic multivitamin (THERAGRAN) tablet Take 1 Tab by mouth daily. Patient may take any OTC MVI 30 Tab 0  
 amLODIPine (NORVASC) 10 mg tablet Take 10 mg by mouth daily.  cloNIDine HCl (CATAPRES) 0.1 mg tablet Take 0.1 mg by mouth three (3) times daily.  hydrOXYzine (ATARAX) 25 mg tablet Take 25 mg by mouth three (3) times daily as needed for Itching. Past History Past Medical History: 
Past Medical History:  
Diagnosis Date  Alcohol abuse, unspecified  BPH (benign prostatic hyperplasia)  Bradycardia  Cirrhosis of liver ([facility] [address]  Colon polyps  Dietary surveillance and counseling  ED (erectile dysfunction)  Heart murmur  Hepatitis C Treated  Holter monitor, abnormal 01/2018  Hypertension  PAC (premature atrial contraction)  Psoriasis  PVC's (premature ventricular contractions)  Sleep apnea   
 no cpap  Special screening for malignant neoplasms, colon  Subdural hematoma ([facility] [address] 01/03/2016  
 from a fall 8/2015  Unspecified disorder of penis Past Surgical History: 
Past Surgical History:  
Procedure Laterality Date  COLONOSCOPY N/A 5/23/2016 COLONOSCOPY WITH HOT SNARE POLYPECTOMY performed by Eneida Gresham MD at [facility] ENDOSCOPY  COLONOSCOPY N/A 5/30/2019 COLONOSCOPY / polypectomy performed by Paco Dowd MD at [facility] ENDOSCOPY  
 HX CATARACT REMOVAL    
 HX CRANIOTOMY  1/3/2016  
 subdural hematoma  HX HERNIA REPAIR Family History: 
Family History Problem Relation Age of Onset  Bipolar Disorder Mother  Alcohol abuse Mother  Heart Disease Father  Suicide Brother Social History: 
Social History Tobacco Use  Smoking status: Never Smoker  Smokeless tobacco: Never Used Substance Use Topics  Alcohol use: Not Currently Alcohol/week: 0.0 standard drinks Comment: 9/2018  Drug use: Yes Types: Marijuana, Cocaine Comment: 2003-last used Allergies: 
No Known Allergies Review of Systems Review of Systems Constitutional: Negative. Negative for chills, diaphoresis and fever. HENT: Negative. Negative for congestion, rhinorrhea and sore throat. Eyes: Negative. Negative for pain, discharge and redness. Respiratory: Negative. Negative for cough, chest tightness, shortness of breath and wheezing. Cardiovascular: Negative. Negative for chest pain. Gastrointestinal: Positive for blood in stool. Negative for abdominal pain, constipation, diarrhea, nausea and vomiting. Genitourinary: Negative. Negative for dysuria, flank pain, frequency, hematuria and urgency. Musculoskeletal: Negative. Negative for back pain and neck pain. Skin: Negative. Negative for rash. Neurological: Negative. Negative for syncope, weakness, numbness and headaches. Psychiatric/Behavioral: Negative. All other systems reviewed and are negative. Physical Exam  
 
Visit Vitals /72 Pulse (!) 111 Temp 97.6 °F (36.4 °C) Resp 13 Wt 68 kg (150 lb) SpO2 100% BMI 22.81 kg/m² Physical Exam  
Constitutional: He is oriented to person, place, and time. He appears well-developed and well-nourished. Non-toxic appearance. He does not have a sickly appearance. He does not appear ill. No distress. HENT:  
Head: Normocephalic and atraumatic. Mouth/Throat: Oropharynx is clear and moist. No oropharyngeal exudate. Eyes: Pupils are equal, round, and reactive to light. EOM are normal. Scleral icterus (bilateral ) is present. Neck: Normal range of motion. Neck supple. No hepatojugular reflux and no JVD present. No tracheal deviation present. No thyromegaly present. Cardiovascular: Normal rate, regular rhythm, S1 normal, S2 normal, normal heart sounds, intact distal pulses and normal pulses. Exam reveals no gallop, no S3 and no S4. No murmur heard. Pulses: Radial pulses are 2+ on the right side, and 2+ on the left side. Dorsalis pedis pulses are 2+ on the right side, and 2+ on the left side. Pulmonary/Chest: Effort normal and breath sounds normal. No respiratory distress. He has no decreased breath sounds. He has no wheezes. He has no rhonchi. He has no rales. Abdominal: Soft. Normal appearance and bowel sounds are normal. He exhibits no distension and no mass. There is no hepatosplenomegaly. There is no tenderness. There is no rigidity, no rebound, no guarding, no CVA tenderness, no tenderness at McBurney's point and negative Moore's sign. Musculoskeletal: Normal range of motion. He exhibits no edema or tenderness. 5 out of 5 throughout. Lymphadenopathy:  
     Head (right side): No submental, no submandibular, no preauricular and no occipital adenopathy present. Head (left side): No submental, no submandibular, no preauricular and no occipital adenopathy present. He has no cervical adenopathy. Right: No supraclavicular adenopathy present. Left: No supraclavicular adenopathy present. Neurological: He is alert and oriented to person, place, and time. He has normal strength and normal reflexes. He is not disoriented. No cranial nerve deficit or sensory deficit. Coordination and gait normal. GCS eye subscore is 4. GCS verbal subscore is 5. GCS motor subscore is 6. Grossly intact. Skin: Skin is warm, dry and intact. No rash noted. He is not diaphoretic. Psychiatric: He has a normal mood and affect. His speech is normal and behavior is normal. Judgment and thought content normal. Cognition and memory are normal.  
Nursing note and vitals reviewed. Diagnostic Study Results Labs - Recent Results (from the past 12 hour(s)) METABOLIC PANEL, COMPREHENSIVE Collection Time: 11/08/19  1:28 PM  
Result Value Ref Range Sodium 142 136 - 145 mmol/L  Potassium 4.9 3.5 - 5.5 mmol/L  
 Chloride 109 100 - 111 mmol/L  
 CO2 23 21 - 32 mmol/L Anion gap 10 3.0 - 18 mmol/L Glucose 133 (H) 74 - 99 mg/dL BUN 21 (H) 7.0 - 18 MG/DL Creatinine 1.58 (H) 0.6 - 1.3 MG/DL  
 BUN/Creatinine ratio 13 12 - 20 GFR est AA 54 (L) >60 ml/min/1.73m2 GFR est non-AA 45 (L) >60 ml/min/1.73m2 Calcium 8.2 (L) 8.5 - 10.1 MG/DL Bilirubin, total 8.6 (H) 0.2 - 1.0 MG/DL  
 ALT (SGPT) 68 (H) 16 - 61 U/L  
 AST (SGOT) 101 (H) 10 - 38 U/L Alk. phosphatase 114 45 - 117 U/L Protein, total 5.7 (L) 6.4 - 8.2 g/dL Albumin 1.6 (L) 3.4 - 5.0 g/dL Globulin 4.1 (H) 2.0 - 4.0 g/dL A-G Ratio 0.4 (L) 0.8 - 1.7 LIPASE Collection Time: 11/08/19  1:28 PM  
Result Value Ref Range Lipase 201 73 - 393 U/L  
CBC WITH AUTOMATED DIFF Collection Time: 11/08/19  1:40 PM  
Result Value Ref Range WBC 18.1 (H) 4.6 - 13.2 K/uL  
 RBC 2.36 (L) 4.70 - 5.50 M/uL HGB 8.3 (L) 13.0 - 16.0 g/dL HCT 24.6 (L) 36.0 - 48.0 % .2 (H) 74.0 - 97.0 FL  
 MCH 35.2 (H) 24.0 - 34.0 PG  
 MCHC 33.7 31.0 - 37.0 g/dL  
 RDW 17.9 (H) 11.6 - 14.5 % PLATELET 464 (L) 260 - 420 K/uL MPV 11.6 9.2 - 11.8 FL  
 NEUTROPHILS 94 (H) 42 - 75 % BAND NEUTROPHILS 1 0 - 5 % LYMPHOCYTES 1 (L) 20 - 51 % MONOCYTES 4 2 - 9 % EOSINOPHILS 0 0 - 5 % BASOPHILS 0 0 - 3 %  
 ABS. NEUTROPHILS 17.2 (H) 1.8 - 8.0 K/UL  
 ABS. LYMPHOCYTES 0.2 (L) 0.8 - 3.5 K/UL  
 ABS. MONOCYTES 0.7 0 - 1.0 K/UL  
 ABS. EOSINOPHILS 0.0 0.0 - 0.4 K/UL  
 ABS. BASOPHILS 0.0 0.0 - 0.06 K/UL  
 DF MANUAL PLATELET COMMENTS ADEQUATE PLATELETS    
 RBC COMMENTS ANISOCYTOSIS 1+ 
    
 RBC COMMENTS POLYCHROMASIA 1+ 
    
 RBC COMMENTS HYPOCHROMIA 2+ 
    
 RBC COMMENTS TARGET CELLS 1+ WBC COMMENTS HYPERSEGMENTED POLYS PROTHROMBIN TIME + INR Collection Time: 11/08/19  1:40 PM  
Result Value Ref Range Prothrombin time 33.5 (H) 11.5 - 15.2 sec INR 3.3 (H) 0.8 - 1.2 AMMONIA  
 Collection Time: 11/08/19  1:40 PM  
Result Value Ref Range Ammonia 37 (H) 11 - 32 UMOL/L  
EKG, 12 LEAD, INITIAL Collection Time: 11/08/19  1:54 PM  
Result Value Ref Range Ventricular Rate 113 BPM  
 Atrial Rate 113 BPM  
 P-R Interval 144 ms QRS Duration 86 ms  
 Q-T Interval 342 ms QTC Calculation (Bezet) 469 ms Calculated P Axis 25 degrees Calculated R Axis -12 degrees Calculated T Axis -10 degrees Diagnosis Sinus tachycardia Possible Anterior infarct , age undetermined Abnormal ECG When compared with ECG of 31-AUG-2017 11:37, 
Vent. rate has increased BY  50 BPM 
QRS duration has decreased Borderline criteria for Anterior infarct are now present TYPE & SCREEN Collection Time: 11/08/19  2:35 PM  
Result Value Ref Range Crossmatch Expiration 11/11/2019 ABO/Rh(D) O POSITIVE Antibody screen NEG   
POC LACTIC ACID Collection Time: 11/08/19  5:02 PM  
Result Value Ref Range Lactic Acid (POC) 5.91 (HH) 0.40 - 2.00 mmol/L  
URINALYSIS W/ RFLX MICROSCOPIC Collection Time: 11/08/19  6:11 PM  
Result Value Ref Range Color JANICE Appearance TURBID Specific gravity 1.022 1.005 - 1.030    
 pH (UA) 5.0 5.0 - 8.0 Protein TRACE (A) NEG mg/dL Glucose NEGATIVE  NEG mg/dL Ketone NEGATIVE  NEG mg/dL Bilirubin LARGE (A) NEG Blood LARGE (A) NEG Urobilinogen 1.0 0.2 - 1.0 EU/dL Nitrites POSITIVE (A) NEG Leukocyte Esterase SMALL (A) NEG URINE MICROSCOPIC ONLY Collection Time: 11/08/19  6:11 PM  
Result Value Ref Range WBC 2 to 5 0 - 4 /hpf  
 RBC 1 to 3 0 - 5 /hpf Epithelial cells FEW 0 - 5 /lpf Bacteria 1+ (A) NEG /hpf Mucus 1+ (A) NEG /lpf Amorphous Crystals 2+ (A) NEG Hyaline cast 10 to 20 0 - 2 /lpf POC LACTIC ACID Collection Time: 11/08/19  6:44 PM  
Result Value Ref Range Lactic Acid (POC) 5.46 (HH) 0.40 - 2.00 mmol/L Radiologic Studies -  
CT ABD PELV WO CONT Final Result IMPRESSION:   
  
Moderate to large volume ascites. Poorly defined posterior right intrahepatic mass measuring 4 cm suggested. Small liver. Varicosities. Findings consistent with cirrhosis. Bilateral intrarenal nonobstructing calculus. Report provided to the emergency department at [address] [place] [address]. XR ABD ACUTE W 1 V CHEST Final Result IMPRESSION: No obstruction or perforation, no active cardiopulmonary disease. 3 views Medical Decision Making Provider Notes (Medical Decision Making): MDM Number of Diagnoses or Management Options Acute cystitis without hematuria:  
Ascites due to alcoholic cirrhosis [facility]):  
Sepsis with acute liver failure without septic shock, due to unspecified organism, unspecified whether hepatic coma present [facility]):  
Diagnosis management comments: GI bleed Liver cirrhosis Anemia Hyperammonemia I am the first provider for this patient. I reviewed the vital signs, available nursing notes, past medical history, past surgical history, family history and social history. Vital Signs-Reviewed the patient's vital signs. Records Reviewed: Nursing Notes (Time of Review: 2:05 PM) ED Course: Progress Notes, Reevaluation, and Consults: 
 
Labs showed white count of 18.1. Lactic of 5.91. Lipase of 201. Acute abdominal series chest X-Ray showed No acute process. EKG showed sinus tachycardia at a rate of 113 bpm. With no ST elevations or depression and non specific T wave changes. CT abdomen pelvis reviewed and discussed with patient. 7:05 PM 11/8/2019 Consult:  Discussed care with Dr Vero Hartmann. Hospitalist. Standard discussion; including history of patients chief complaint, available diagnostic results, and treatment course. Agrees with admission. 7:26 PM 
 
 
Diagnosis Clinical Impression: 1. Acute cystitis without hematuria 2. Ascites due to alcoholic cirrhosis (HCC) 3. Sepsis with acute liver failure without septic shock, due to unspecified organism, unspecified whether hepatic coma present ([facility] [address] Disposition: Admitted. Attestation Provider Attestation:    
I personally performed the services described in the documentation, reviewed the documentation and it accurately and completely records my words and actions utilizing the [address] [place] November 08, 2019 at 7:27 PM - Forest Kennedy DO Disclaimer. It is dictated using utilizing voice recognition software. Unfortunately this leads to occasional typographical errors. I apologize in advance if the situation occurs. If questions arise please do not hesitate to contact me or call our department.

## 2019-11-09 PROBLEM — I85.11 SECONDARY ESOPHAGEAL VARICES WITH BLEEDING (HCC): Status: ACTIVE | Noted: 2019-01-01

## 2019-11-09 PROBLEM — K70.30 ALCOHOLIC CIRRHOSIS (HCC): Status: ACTIVE | Noted: 2019-01-01

## 2019-11-09 NOTE — PROGRESS NOTES
Patient with Code Blue and ROSC obtained. Next of kin on chart, friend, Yahaira Walker called at [phone]. No answer. Message states that \"This voice box has not been set up for messages. \"

## 2019-11-09 NOTE — ED NOTES
Bedside and Verbal shift change report given to Janine Dominguez RN by Rao Canales RN (offgoing nurse). Report included the following information SBAR.

## 2019-11-09 NOTE — ROUTINE PROCESS
TRANSFER - IN REPORT: 
 
Verbal report [facility] from [address] [facility]  being received from [phone] [id number] for ordered procedure Report consisted of patients Situation, Background, Assessment and  
Recommendations(SBAR). Information from the following report(s) SBAR was reviewed with the receiving nurse. Opportunity for questions and clarification was provided. Assessment completed upon patients arrival to unit and care assumed.

## 2019-11-09 NOTE — PROGRESS NOTES
[place] SAPP was called for change in patient's status. Noted to have agonal breathing With weak pulse. Upon arrival noted to be unresponsive being bagged, had a rhythm and a pulse. Decision made to emergently intubate the patient For airway protection and ventilation anesthesia arrive. And intubated the patient without any event however was noted to have significant bleeding in the upper airway which was suctioned clear. Patient noted to be hypotensive was being-getting wide open FFP and blood. Continued to bleed. Blood pressure remains low started on Levophed-titrated up to 20 mics ABG obtained with pH of 7.05 
1 amp sodium bicarb given After ensuring stable rhythm and measurable blood pressure patient was continuously being bagged and safely transported to ICU bed. Patient needs emergent lines for further fluid volume and pressor resuscitation. ICU team resumed care . Ventilator orders placed Critical Care Time: The services I provided to this patient were to treat and/or prevent clinically significant deterioration that could result in the failure of one or more body systems and/or organ systems due to respiratory distress, hypoxia, cardiac dysrhythmia. 
  
Services included the following: 
-reviewing nursing notes and old charts 
-vital sign assessments 
-direct patient care 
-medication orders and management 
-interpreting and reviewing diagnostic studies/labs 
-re-evaluations 
-documentation time 
  
Aggregate critical care time was 55 minutes, which includes only time during which I was engaged in work directly related to the patient's care as described above. During this entire length of time I was immediately available to the patient.  The reason for providing this level of medical care for this critically ill patient was due a critical illness that impaired one or more vital organ systems such that there was a high probability of imminent or life threatening deterioration in the patients condition. This care involved high complexity decision making to assess, manipulate, and support vital system functions, to treat this degreee vital organ system failure and to prevent further life threatening deterioration of the patients condition Chante Paez MD

## 2019-11-09 NOTE — H&P
Date of Admission: 11/8/2019 Assessment:  
Sepsis: Initially was hypotensive now resolved after IV fluids elevated WBCs at 18. 1. Potential sources include GI and , including SBP Suspected GI bleed: Hb currently 8.3, baseline unclear. No active bleeding noted at present. Black stool x 1 this am. Hemoccult +. 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 Vick Riedel; ammonia level 37 Leukocytosis Hematuria: No overt UTI with only 1-2 WBCs on his UA Thrombocytopenia with coagulopathy related to cirrhosis Hypertension: We will continue his Norvasc and clonidine but hold for blood pressures less than 477 systolic Plan:  
GI consulted - Dr. Rita Dugan aware Serial H&H. Transfuse as per hospital protocol IV Protonix N.p.o. after midnight as I suspect he will need an EGD in the morning Clear liquid diet for now Lasix 40 mg IV x1  
continue Aldactone Next we will discuss paracentesis with GI. Given concern for sepsis will send for cell count with differential, protein, albumin, culture. Follow-up blood cultures x2 sets  
follow-up urine culture Sapna Mcgraw D.O. Internal Medicine and Infectious Diseases Subjective:  
 Patient is a 62 y.o.male who is being evaluated for sepsis and possible GI bleed. Mr. Karyn Sahu is a pleasant 69-year-old gentleman with a past medical history significant for BPH, hepatitis C, cirrhosis of the liver followed by Dr. Vick Riedel, and hypertension who is presenting to the emergency department with progressive shortness of breath generalized fatigue and loss of appetite. He states that he had been seen by Dr. Vick Riedel about a week ago and he had been started on prednisone as well as Lasix and Aldactone. He states that he is gained about 30 pounds over the last 2 to 3 weeks. He has noted increasing edema in his lower extremities as well as severe scrotal edema.   He has chronic ascites and was due for paracentesis next Thursday however his abdomen had become more distended over the past several days. He noted this morning that he had an episode of black tarry stool which is what prompted him to come to the emergency department for further evaluation. He had recently had a colonoscopy about 3 months ago and had polyps removed. He is aware that due to his cirrhosis he has a prior diagnosis of varices. He notes that he has been feeling progressively weak and tired over the last several weeks and has a poor appetite. He has not eaten a full meal in the last 7 to 10 days. He denies any fevers or chills. He has chronic loose stools. He has decreased urination over the last several days. He has some developing shortness of breath. Past Medical History:  
Diagnosis Date  Alcohol abuse, unspecified  BPH (benign prostatic hyperplasia)  Bradycardia  Cirrhosis of liver ([address]  Colon polyps  Dietary surveillance and counseling  ED (erectile dysfunction)  Heart murmur  Hepatitis C Treated  Holter monitor, abnormal 01/2018  Hypertension  PAC (premature atrial contraction)  Psoriasis  PVC's (premature ventricular contractions)  Sleep apnea   
 no cpap  Special screening for malignant neoplasms, colon  Subdural hematoma ([address] 01/03/2016  
 from a fall 8/2015  Unspecified disorder of penis Past Surgical History:  
Procedure Laterality Date  COLONOSCOPY N/A 5/23/2016 COLONOSCOPY WITH HOT SNARE POLYPECTOMY performed by Trudell Claude, MD at [facility] ENDOSCOPY  COLONOSCOPY N/A 5/30/2019 COLONOSCOPY / polypectomy performed by Debbie Yu MD at [facility] ENDOSCOPY  
 HX CATARACT REMOVAL    
 HX CRANIOTOMY  1/3/2016  
 subdural hematoma  HX HERNIA REPAIR Family History Problem Relation Age of Onset  Bipolar Disorder Mother  Alcohol abuse Mother  Heart Disease Father  Suicide Brother Medications reviewed as below: Current Facility-Administered Medications Medication Dose Route Frequency Provider Last Rate Last Dose  pantoprazole (PROTONIX) 40 mg in 0.9% sodium chloride (MBP/ADV) 50 mL MBP  8 mg/hr IntraVENous CONTINUOUS Praful Kennedy DO 10 mL/hr at 11/08/19 1728 8 mg/hr at 11/08/19 1728  sodium chloride (NS) flush 5-10 mL  5-10 mL IntraVENous PRN Praful Kennedy DO      
 piperacillin-tazobactam (ZOSYN) 3.375 g in 0.9% sodium chloride (MBP/ADV) 100 mL MBP  3.375 g IntraVENous Q6H Praful Kennedy  mL/hr at 11/08/19 1732 3.375 g at 11/08/19 1732  sodium chloride 0.9 % bolus infusion 40 mL  40 mL IntraVENous ONCE Rcahel Kennedy DO      
 
Current Outpatient Medications Medication Sig Dispense Refill  ergocalciferol (ERGOCALCIFEROL) 50,000 unit capsule 50,000 Units every seven (7) days.  folic acid (FOLVITE) 1 mg tablet TK 1 T PO  D  0  
 gabapentin (NEURONTIN) 300 mg capsule  potassium chloride SR (K-TAB) 20 mEq tablet 40 mEq daily.  tamsulosin (FLOMAX) 0.4 mg capsule Take 1 Cap by mouth daily (after dinner). 90 Cap 3  
 sildenafil citrate (VIAGRA) 100 mg tablet Take 1 Tab by mouth daily as needed (ED) for up to 1 dose. 18 Tab 3  
 Ustekinumab (USTEKINAUMAB) 90 mg/mL injection by SubCUTAneous route. Every 13 weeks  therapeutic multivitamin (THERAGRAN) tablet Take 1 Tab by mouth daily. Patient may take any OTC MVI 30 Tab 0  
 amLODIPine (NORVASC) 10 mg tablet Take 10 mg by mouth daily.  cloNIDine HCl (CATAPRES) 0.1 mg tablet Take 0.1 mg by mouth three (3) times daily.  hydrOXYzine (ATARAX) 25 mg tablet Take 25 mg by mouth three (3) times daily as needed for Itching. No Known Allergies Social History Socioeconomic History  Marital status:  Spouse name: Not on file  Number of children: Not on file  Years of education: Not on file  Highest education level: Not on file Occupational History  Not on file Social Needs  Financial resource strain: Not on file  Food insecurity:  
  Worry: Not on file Inability: Not on file  Transportation needs:  
  Medical: Not on file Non-medical: Not on file Tobacco Use  Smoking status: Never Smoker  Smokeless tobacco: Never Used Substance and Sexual Activity  Alcohol use: Not Currently Alcohol/week: 0.0 standard drinks Comment: 9/2018  Drug use: Yes Types: Marijuana, Cocaine Comment: 2003-last used  Sexual activity: Not on file Lifestyle  Physical activity:  
  Days per week: Not on file Minutes per session: Not on file  Stress: Not on file Relationships  Social connections:  
  Talks on phone: Not on file Gets together: Not on file Attends Buddhism service: Not on file Active member of club or organization: Not on file Attends meetings of clubs or organizations: Not on file Relationship status: Not on file  Intimate partner violence:  
  Fear of current or ex partner: Not on file Emotionally abused: Not on file Physically abused: Not on file Forced sexual activity: Not on file Other Topics Concern  Not on file Social History Narrative  Not on file Review of Systems Negative Unless BOLDED General: fevers, chills, myalgias, arthralgias, unexplained weight loss, malaise, fatigue, 30 lb weight gain. HEENT:  headaches,sinus pain or presure, recent URI, recent dental procedures;  tinnitus, hearing loss , visual changes, catarats, dizziness or blurred vision PUlMONARY:  cough , shortness of breath, sputum production, hx of asthma or COPD. previous treatement for TB or PPD. Cardiovascular: chest pain, previous CAD/MI, vavlular heart disease,  murmurs GI:   nausea, vomiting, diarrhea, abdominal pain, prior C.diff, black tarry stools :  urinary frequency, dysuria, hematuria, bladder incontinence.   
Neurologic:  seizures, syncope or prior CVA/TIA, confusion, memory impairment, neuropathy Musculoskeletal:  myalgias arthralgias, joint pain/ swelling,  back pain Skin:  Purities,  recurrent cellulitis,  chronic stasis ulcer, diabetic foot ulcers Endocrine: polyuria, polydipsia, hair loss, weight gain Psych: Denies depression or treatment by a psychiatrist/psycologist 
Heme-Onc: prior DVT, easy bruising, fatigue, malignancy Objective:  
 
  
Visit Vitals /72 Pulse (!) 111 Temp 97.6 °F (36.4 °C) Resp 13 Wt 68 kg (150 lb) SpO2 100% BMI 22.81 kg/m² Temp (24hrs), Av.6 °F (36.4 °C), Min:97.6 °F (36.4 °C), Max:97.6 °F (36.4 °C) General:   awake alert and oriented Skin:  Jaundiced, thin, warm, HEENT:  Normocephalic, atraumatic, PERRL, EOMI, + scleral icterus or pallor; no conjunctival hemmohage; nasal and oral mucous are moist and without evidence of lesions. No thrush. Dentition fair. Neck supple, no bruits. Lymph Nodes:   no cervical, axillary or inguinal adenopathy Lungs:   non-labored, diminished at bases, fine crackles at bases Heart:  tachycardic, s1 and s2; no murmurs rubs or gallops, +3 LE edema, + pedal pulses Abdomen:  soft, distended, hypoactive BS, mild diffuse tenderness Genitourinary:  scrotal edema, mild tenderness on exam, no erythema Extremities:   no clubbing, cyanosis; no joint effusions or swelling; Full ROM of all large joints to the upper and lower extremities; muscle mass appropriate for age Neurologic:  No gross focal sensory abnormalities; 5/5 muscle strength to upper and lower extremities. Speech appropirate. Cranial nerves intact Psychiatric:   appropriate and interactive. Labs: Results:  
Chemistry Recent Labs 19 
1328 *   
K 4.9  CO2 23 BUN 21* CREA 1.58* CA 8.2* AGAP 10 BUCR 13  
  
TP 5.7* ALB 1.6*  
GLOB 4.1* AGRAT 0.4* CBC w/Diff Recent Labs 19 
1340 WBC 18.1*  
RBC 2.36* HGB 8.3* HCT 24.6*  
* GRANS 94* LYMPH 1*  
EOS 0 No results found for: SDES No results found for: CULT Imaging: All imaging reviewed from Admission to present as per radiology interpretation in [address] [facility]

## 2019-11-09 NOTE — CONSULTS
[email or web address] 
[phone] GASTROENTEROLOGY CONSULT Impression: 1. Massive GI bleed; suspect variceal bleed- emergent EGD today; undergoing multiple blood transfusions 2. Cirrhosis-decompensated,  Hep C with esophageal varices- MELD 41 
3. Liver mass/lesion- noted on US 10/24/19- r/o HCC- will need MRI liver once stable- ? New finding- will need to see if this has been addressed as outpatient 4. Portal HTN 
5. Ascites due to portal HTN- will need paracentesis in the near future once stable; will need fluid analysis 6. Anemia due to acute GI blood loss- likely due to esophageal bleed **old records from office are unable to be reviewed at this moment- will review and update as this information becomes available. ADDENDUM 11:51am: Records from office reviewed. Seen 11/6/19 by Dr. Myrtis Castleman- at that time MELD 28; patient was still drinking 3-4 alcoholic drinks per day and had elevated LFTs, increased bilirubin to 9.9 with worsening ascites, and new liver lesion noted on US for [facility] [address] screening. He was placed on prednisolone for [facility] hepatitis[facility] has pending MRI for Monday. EGD has been performed- suspect EV bleeding- unable to clear blood despite aggressive lavage- no obvious gastric varices. Discussed case with IR Dr. Rui Scott; given MELD score, hemodynamic instability- not a candidate for TIPS or other IR intervention. Very guarded prognosis. Plan: 1. Emergent EGD now; d/w Dr. Rui Scott in IR- will be available for consult if needed 2. Transfusion protocol; will likely need massive transfusions 3. Continue Octreotide gtt 4. Continue Protonix gtt 5. Medical/hemodynamic management per ICU team 
6. Additional GI recommendations are pending EGD findings and medical record review Chief Complaint: GI bleed HPI: 
Aylin Hood is a 62 y.o. male who I am being asked to see in consultation for an opinion regarding the above.   History is obtained via medical record given he is intubated and hemodynamically stable. Per H&P he presented to ER with SOB, fatigue, MELISSA and increasing edema. He had reported on black tarry stool prior to arrival.  He was last seen apparently by Dr. Ehsan Chapin in the office a couple of weeks ago and started on prednisone, lasix, and aldactone. Overnight he began to decompensate  With additional hematochezia and hematemesis. He soon thereafter became hemodynamically unstable requiring multiple units of blood and pressors. He is now also on mechanical ventilator. He had a recent colonoscopy with polypectomy. PMH:  
Past Medical History:  
Diagnosis Date  Alcohol abuse, unspecified  BPH (benign prostatic hyperplasia)  Bradycardia  Cir[address]osis of [address]  Colon polyps  Dietary surveillance and counseling  ED (erectile dysfunction)  Heart murmur  Hepatitis C Treated  Holter monitor, abnormal 01/2018  Hypertension  PAC (premature atrial contraction)  Psoriasis  PVC's (premature ventricular contractions)  Sleep apnea   
 no cpap  Special screening for malignant neoplasms, colon  Subdural hematoma ([facility] [address] 01/03/2016  
 from a fall 8/2015  Unspecified disorder of penis PSH:  
Past Surgical History:  
Procedure Laterality Date  COLONOSCOPY N/A 5/23/2016 COLONOSCOPY WITH HOT SNARE POLYPECTOMY performed by Trudell Claude, MD at [facility] ENDOSCOPY  COLONOSCOPY N/A 5/30/2019 COLONOSCOPY / polypectomy performed by Debbie Yu MD at [facility] ENDOSCOPY  
 HX CATARACT REMOVAL    
 HX CRANIOTOMY  1/3/2016  
 subdural hematoma  HX HERNIA REPAIR Social HX:  
Social History Socioeconomic History  Marital status:  Spouse name: Not on file  Number of children: Not on file  Years of education: Not on file  Highest education level: Not on file Occupational History  Not on file Social Needs  Financial resource strain: Not on file  Food insecurity:  
  Worry: Not on file Inability: Not on file  Transportation needs:  
  Medical: Not on file Non-medical: Not on file Tobacco Use  Smoking status: Never Smoker  Smokeless tobacco: Never Used Substance and Sexual Activity  Alcohol use: Not Currently Alcohol/week: 0.0 standard drinks Comment: 9/2018  Drug use: Yes Types: Marijuana, Cocaine Comment: 2003-last used  Sexual activity: Not on file Lifestyle  Physical activity:  
  Days per week: Not on file Minutes per session: Not on file  Stress: Not on file Relationships  Social connections:  
  Talks on phone: Not on file Gets together: Not on file Attends Spiritism service: Not on file Active member of club or organization: Not on file Attends meetings of clubs or organizations: Not on file Relationship status: Not on file  Intimate partner violence:  
  Fear of current or ex partner: Not on file Emotionally abused: Not on file Physically abused: Not on file Forced sexual activity: Not on file Other Topics Concern  Not on file Social History Narrative  Not on file FHX:  
Family History Problem Relation Age of Onset  Bipolar Disorder Mother  Alcohol abuse Mother  Heart Disease Father  Suicide Brother Allergy: No Known Allergies Patient Active Problem List  
Diagnosis Code  Subdural hematoma [address] [address] C30.9A2G  Hypertension I10  
 Alcohol abuse F10.10  Liver enzyme elevation R74.8  Thrombocytopenia ([address] D69.6  Sleep apnea in adult G47.30  Obesity (BMI 30.0-34. 9) E66.9  
 ED (erectile dysfunction) of organic origin N52.9  Benign prostatic hyperplasia N40.0  Chlamydia A74.9  Hepatitis C virus infection without hepatic coma B19.20  Aortic valve sclerosis I35.8  UTI (urinary tract infection) N39.0  Ascites R18.8  Sepsis ([address] A41.9 Home Medications:  
 
Medications Prior to Admission Medication Sig  potassium chloride SR (K-TAB) 20 mEq tablet 40 mEq daily.  therapeutic multivitamin (THERAGRAN) tablet Take 1 Tab by mouth daily. Patient may take any OTC MVI  amLODIPine (NORVASC) 10 mg tablet Take 10 mg by mouth daily.  cloNIDine HCl (CATAPRES) 0.1 mg tablet Take 0.1 mg by mouth three (3) times daily.  ergocalciferol (ERGOCALCIFEROL) 50,000 unit capsule 50,000 Units every seven (7) days.  folic acid (FOLVITE) 1 mg tablet TK 1 T PO  D  
 gabapentin (NEURONTIN) 300 mg capsule  tamsulosin (FLOMAX) 0.4 mg capsule Take 1 Cap by mouth daily (after dinner).  sildenafil citrate (VIAGRA) 100 mg tablet Take 1 Tab by mouth daily as needed (ED) for up to 1 dose.  Ustekinumab (USTEKINAUMAB) 90 mg/mL injection by SubCUTAneous route. Every 13 weeks  hydrOXYzine (ATARAX) 25 mg tablet Take 25 mg by mouth three (3) times daily as needed for Itching. Review of Systems:  
 
ROS: unable to obtain due to patient's mental status Visit Vitals BP (!) 74/48 Pulse 100 Temp 98 °F (36.7 °C) Resp 22 Ht 5' 7.99\" (1.727 m) Comment: previous encounter Wt 68 kg (150 lb) SpO2 100% BMI 22.81 kg/m² Physical Assessment:  
 
constitutional: appearance: well developed, well nourished, obese habitus, no deformities 
skin: inspection: no rashes, ulcers, or other lesions; no clubbing or telangiectasias. + jaundice palpation: no induration or subcutaneos nodules. eyes: inspection: normal conjunctivae and lids; no jaundice pupils: normal 
ENMT: mouth: intubated; bloody secretions noted 
neck: thyroid: normal size, consistency and position; no masses or tenderness. respiratory: effort: on mechanical ventilator 
cardiovascular: abdominal aorta: normal size and position; no bruits. palpation: PMI of normal size and position; normal rhythm; no thrill or murmurs. abdominal: abdomen: + ascites. hernias: no hernias appreciated. liver: normal size and consistency. spleen: not palpable. - mild bruising in lower abdomen 
rectal: hemoccult/guaiac: not performed. musculoskeletal: digits and nails: no clubbing, cyanosis, petechiae or other inflammatory conditions. gait: not evaluated head and neck: normal range of motion; no pain, crepitation or contracture. spine/ribs/pelvis: normal range of motion; no pain, deformity or contracture. neurologic: cranial nerves: unable to evaluate 
psychiatric: judgement/insight: unable to evaluate Basic Metabolic Profile Recent Labs 11/09/19 
8224   
K 5.1  CO2 20* BUN 28* * CA 7.1*  
  
  
CBC w/Diff Recent Labs 11/09/19 0343 11/08/19 
1340 WBC  --   --  18.1*  
RBC  --   --  2.36* HGB 5.0*   < > 8.3* HCT 14.8*   < > 24.6* MCV  --   --  104.2*  
MCH  --   --  35.2*  
MCHC  --   --  33.7 RDW  --   --  17.9*  
PLT  --   --  131*  
 < > = values in this interval not displayed. Recent Labs 11/08/19 
1340 GRANS 94* LYMPH 1*  
EOS 0 Hepatic Function Recent Labs 11/09/19 
0343 11/08/19 
1328 ALB 0.9* 1.6* TP 3.2* 5.7* TBILI 5.4* 8.6* SGOT 103* 101* AP 59 114 LPSE  --  201 Coags Recent Labs 11/09/19 0343 11/08/19 
1340 PTP 57.9* 33.5* INR 6.7* 3.3* APTT 64.8*  --   
   
 
 
Chraly Solis NP. [facility], [address] Www. [employer]ch[employer]/jack

## 2019-11-09 NOTE — PROGRESS NOTES
responded to a [employer]' for Ritu Leblanc, who is a 62 y.o.,male. Patients Primary Language is: [place]. Patient was revived. No family present. The reason the Patient came to the hospital is:  
Patient Active Problem List  
 Diagnosis Date Noted  UTI (urinary tract infection) 11/08/2019  Ascites 11/08/2019  Sepsis ([address] 11/08/2019  Aortic valve sclerosis 06/05/2018  Sleep apnea in adult 12/27/2017  Obesity (BMI 30.0-34.9) 12/27/2017  ED (erectile dysfunction) of organic origin 12/27/2017  Benign prostatic hyperplasia 12/27/2017  Chlamydia 12/27/2017  Hepatitis C virus infection without hepatic coma 12/27/2017  Thrombocytopenia ([facility] [address] 01/06/2016  Subdural hematoma ([facility]) 01/02/2016  Hypertension 01/02/2016  Alcohol abuse 01/02/2016  Liver enzyme elevation 01/02/2016 The  provided the following Interventions: 
Initiated a relationship of care and support. Provided Crisis Pastoral Care Offered prayer and assurance of continued prayers on patient's behalf. Chart reviewed. The following outcomes were achieved: 
 
 
Assessment: 
Patient does not have any Confucianist/cultural needs that will affect patients preferences in health care. Patient did not indicate any spiritual or Confucianist issues which require Spiritual Care Services interventions at this time. Plan: 
Chaplains will continue to follow and will provide pastoral care on an as needed/requested basis.  recommends bedside caregivers page  on duty if patient shows signs of acute spiritual or emotional distress. Chaplain Brenda Garcia Spiritual Care Department [phone]

## 2019-11-09 NOTE — CONSULTS
[facility] Pulmonary, Critical Care, and Sleep Medicine Name: Apolinar Garcia MRN: [id number] : 1955 Hospital: [address] [place] [address] Date: 2019 Critical Care Consultation IMPRESSION:  
· Hypotension-secondary to hemorrhagic shock from acute upper GI bleeding-high risk for decompensation · Cirrhosis of liver secondary to hep C with esophageal varices likely source of bleeding · Coagulopathy secondary to liver disease · Thrombocytopenia · Altered mental status-likely hepatic encephalopathy · Ascites secondary to cirrhosis portal hypertension · MARIBEL Patient Active Problem List  
Diagnosis Code  Subdural hematoma ([facility] [facility] T78.4W6I  Hypertension I10  
 Alcohol abuse F10.10  Liver enzyme elevation R74.8  Thrombocytopenia ([facility] [address] [facility] D69.6  Sleep apnea in adult G47.30  Obesity (BMI 30.0-34. 9) E66.9  
 ED (erectile dysfunction) of organic origin N52.9  Benign prostatic hyperplasia N40.0  Chlamydia A74.9  Hepatitis C virus infection without hepatic coma B19.20  Aortic valve sclerosis I35.8  UTI (urinary tract infection) N39.0  Ascites R18.8  Sepsis ([facility] [address] A41.9 RECOMMENDATIONS:  
· Resp: Titrate FiO2/ supp O2 for SpO2 >94%; needs continued attention to airway protection and may need intubation · I/D: Afebrile; aleukocytosis; on Zosyn-Gleevec coverage for variceal bleed. Can consider ceftriaxone · Hem/Onc: Needs blood transfusions with packed RBC and FFP to correct hemoglobin hematocrit more than 7/21 and correct coagulopathy · CVS: Monitor hemodynamics closely, if not able to resuscitate adequately would need transfer to intensive care unit · Metabolic: Daily BMP; monitor e-lytes; replace PRN 
· Renal: Trend Renal indices; Diuresis, Landers to BSD, · Endocrine: POC Glucose q6; Check TSH level · GI: Protonix drip , consider octreotide , gastroenterology consultation trend LFTs, Zofran PRN for N/V , follow ammonia level · Musc/Skin: No acute issues, wound care · Neuro: Check ammonia, avoid any sedation · Fluids: Recommend banana bag daily · Code Status: Full code Best Practice: · Sepsis Bundle per Hospital Protocol · Glycemic control; avoid Hypoglycemia · Stress ulcer prophylaxis. Protonix · DVT prophylaxis. Mechanical 
· Need for Lines, mcmahon assessed. Subjective/History: This patient has been seen and evaluated at the request of Dr. Ramón Mena  for critical care evaluation for transfer to ICU-patient with acute upper GI bleed and hypotension. 11/09/19 Patient is a 62 y.o. male had a rapid response for hypotension and therefore critical care consult was called. Bedside nurse states that patient had a bright red bloody bowel movement and subsequently noted to be hypotensive. He became tachycardic and immediately after that also had altered mental status. This was followed by 2 other large bloody bowel movements and persistent hypotension. He has been so far resuscitated with transfusion of 2 units of packed RBC 1 unit of FFP and in the process of running third unit of packed RBC. Also received albumin and slowly seems to be responding with most recent blood pressure now 108/80. Blood pressure was as low as 80/40. Patient is awake to answer simple questions-complains of abdominal pain, denies difficulty breathing, denies chest pain. No nausea no vomiting Denies any other complaints. Per nursing there has been some urine output. He has a [place] cath. Peripheral lines Clovia Carls History obtained from review of chart: 
Patient with a past medical history significant for BPH, hepatitis C, cirrhosis of the liver followed by Dr. Abhilash Berry, and hypertension who is presenting to the emergency department with progressive shortness of breath generalized fatigue and loss of appetite.   He states that he had been seen by Dr. Janey Vaughn about a week ago and he had been started on prednisone as well as Lasix and Aldactone. He states that he is gained about 30 pounds over the last 2 to 3 weeks. He has noted increasing edema in his lower extremities as well as severe scrotal edema. He has chronic ascites and was due for paracentesis next Thursday however his abdomen had become more distended over the past several days. He noted this morning that he had an episode of black tarry stool which is what prompted him to come to the emergency department for further evaluation. He had recently had a colonoscopy about 3 months ago and had polyps removed. He is aware that due to his cirrhosis he has a prior diagnosis of varices. He notes that he has been feeling progressively weak and tired over the last several weeks and has a poor appetite. He has not eaten a full meal in the last 7 to 10 days. He denies any fevers or chills. He has chronic loose stools. He has decreased urination over the last several days. He has some developing shortness of breath. Past Medical History:  
Diagnosis Date  Alcohol abuse, unspecified  BPH (benign prostatic hyperplasia)  Bradycardia  Cirrhosis of liver ([facility] [address] [facility]  Colon polyps  Dietary surveillance and counseling  ED (erectile dysfunction)  Heart murmur  Hepatitis C Treated  Holter monitor, abnormal 01/2018  Hypertension  PAC (premature atrial contraction)  Psoriasis  PVC's (premature ventricular contractions)  Sleep apnea   
 no cpap  Special screening for malignant neoplasms, colon  Subdural hematoma ([facility] [address] 01/03/2016  
 from a fall 8/2015  Unspecified disorder of penis Past Surgical History:  
Procedure Laterality Date  COLONOSCOPY N/A 5/23/2016 COLONOSCOPY WITH HOT SNARE POLYPECTOMY performed by Angi Jones MD at [facility] ENDOSCOPY  COLONOSCOPY N/A 5/30/2019 COLONOSCOPY / polypectomy performed by Doug Irvin MD at [facility] ENDOSCOPY  
 HX CATARACT REMOVAL    
 HX CRANIOTOMY  1/3/2016  
 subdural hematoma  HX HERNIA REPAIR Prior to Admission medications Medication Sig Start Date End Date Taking? Authorizing Provider  
potassium chloride SR (K-TAB) 20 mEq tablet 40 mEq daily. 4/4/19  Yes Provider, Historical  
therapeutic multivitamin (THERAGRAN) tablet Take 1 Tab by mouth daily. Patient may take any OTC MVI 1/7/16  Yes Jojo Olvera MD  
amLODIPine (NORVASC) 10 mg tablet Take 10 mg by mouth daily. Yes Kvng, MD Elisa  
cloNIDine HCl (CATAPRES) 0.1 mg tablet Take 0.1 mg by mouth three (3) times daily. Yes Other, MD Elisa  
ergocalciferol (ERGOCALCIFEROL) 50,000 unit capsule 50,000 Units every seven (7) days. 3/7/19   Provider, Historical  
folic acid (FOLVITE) 1 mg tablet TK 1 T PO  D 1/24/19   Provider, Historical  
gabapentin (NEURONTIN) 300 mg capsule  3/9/19   Provider, Historical  
tamsulosin (FLOMAX) 0.4 mg capsule Take 1 Cap by mouth daily (after dinner). 4/8/19   Marivel Chavis MD  
sildenafil citrate (VIAGRA) 100 mg tablet Take 1 Tab by mouth daily as needed (ED) for up to 1 dose. 4/8/19   Marivel Chavis MD  
Ustekinumab (USTEKINAUMAB) 90 mg/mL injection by SubCUTAneous route. Every 13 weeks    Provider, Historical  
hydrOXYzine (ATARAX) 25 mg tablet Take 25 mg by mouth three (3) times daily as needed for Itching. Other, MD Elisa  
 
 
Current Facility-Administered Medications Medication Dose Route Frequency  albumin human 25% (BUMINATE) solution 25 g  25 g IntraVENous Q6H  
 0.9% sodium chloride 1,000 mL with mvi, adult no. 4 with vit K 10 mL, thiamine 745 mg, folic acid 1 mg infusion   IntraVENous Q24H  pantoprazole (PROTONIX) 40 mg in 0.9% sodium chloride (MBP/ADV) 50 mL MBP  8 mg/hr IntraVENous CONTINUOUS  piperacillin-tazobactam (ZOSYN) 3.375 g in 0.9% sodium chloride (MBP/ADV) 100 mL MBP  3.375 g IntraVENous Q6H  
  [Held by provider] amLODIPine (NORVASC) tablet 10 mg  10 mg Oral DAILY  [Held by provider] cloNIDine HCl (CATAPRES) tablet 0.1 mg  0.1 mg Oral TID  potassium chloride (K-DUR, KLOR-CON) SR tablet 40 mEq  40 mEq Oral DAILY  tamsulosin (FLOMAX) capsule 0.4 mg  0.4 mg Oral PCD  sodium chloride (NS) flush 5-40 mL  5-40 mL IntraVENous Q8H  
 furosemide (LASIX) injection 40 mg  40 mg IntraVENous DAILY  influenza vaccine  (6 mos+)(PF) (FLUARIX/FLULAVAL/FLUZONE QUAD) injection 0.5 mL  0.5 mL IntraMUSCular PRIOR TO DISCHARGE No Known Allergies Social History Tobacco Use  Smoking status: Never Smoker  Smokeless tobacco: Never Used Substance Use Topics  Alcohol use: Not Currently Alcohol/week: 0.0 standard drinks Comment: 2018 Family History Problem Relation Age of Onset  Bipolar Disorder Mother  Alcohol abuse Mother  Heart Disease Father  Suicide Brother Review of Systems: 
Review of systems not obtained due to patient factors. Objective:  
Vital Signs:   
Visit Vitals /72 Pulse (!) 113 Temp 98.2 °F (36.8 °C) Resp 19 Wt 68 kg (150 lb) SpO2 100% BMI 22.81 kg/m² O2 Device: Room air Temp (24hrs), Av.1 °F (36.7 °C), Min:97.2 °F (36.2 °C), Max:99.4 °F (37.4 °C) Intake/Output:  
Last shift:       1901 -  0700 In: 1292.7 [P.O.:240; I.V.:258] Out: 0 Last 3 shifts: No intake/output data recorded. Intake/Output Summary (Last 24 hours) at 2019 0551 Last data filed at 2019 7326 Gross per 24 hour Intake 1292.7 ml Output 0 ml Net 1292.7 ml Physical Exam:  
 
General:  Drowsy, icteric Head:  Normocephalic, without obvious abnormality, atraumatic. Eyes:  Conjunctivae-icteric. PERRL, Nose: Nares normal. Septum midline. Mucosa normal. No drainage or sinus tenderness. Throat: Lips, mucosa, and tongue normal. Teeth and gums -dry Neck: Supple, symmetrical, trachea midline, no adenopathy, no carotid bruit and no JVD. Lungs:   Symmetrical chest rise; good AE bilat; CTAB; no wheezes/rhonchi/rales noted. Heart:  RRR, S1, S2 normal, Abdomen:   Soft, distended Bowel sounds hypoactive Extremities: Extremities normal, atraumatic, no cyanosis +3 edema. Pulses: 2+ and symmetric all extremities. Skin: Skin color, texture, turgor normal. No rashes or lesions Neurologic: Grossly nonfocal, lethargic Devices: none Data:  
 
Recent Results (from the past 24 hour(s)) METABOLIC PANEL, COMPREHENSIVE Collection Time: 11/08/19  1:28 PM  
Result Value Ref Range Sodium 142 136 - 145 mmol/L Potassium 4.9 3.5 - 5.5 mmol/L Chloride 109 100 - 111 mmol/L  
 CO2 23 21 - 32 mmol/L Anion gap 10 3.0 - 18 mmol/L Glucose 133 (H) 74 - 99 mg/dL BUN 21 (H) 7.0 - 18 MG/DL Creatinine 1.58 (H) 0.6 - 1.3 MG/DL  
 BUN/Creatinine ratio 13 12 - 20 GFR est AA 54 (L) >60 ml/min/1.73m2 GFR est non-AA 45 (L) >60 ml/min/1.73m2 Calcium 8.2 (L) 8.5 - 10.1 MG/DL Bilirubin, total 8.6 (H) 0.2 - 1.0 MG/DL  
 ALT (SGPT) 68 (H) 16 - 61 U/L  
 AST (SGOT) 101 (H) 10 - 38 U/L Alk. phosphatase 114 45 - 117 U/L Protein, total 5.7 (L) 6.4 - 8.2 g/dL Albumin 1.6 (L) 3.4 - 5.0 g/dL Globulin 4.1 (H) 2.0 - 4.0 g/dL A-G Ratio 0.4 (L) 0.8 - 1.7 LIPASE Collection Time: 11/08/19  1:28 PM  
Result Value Ref Range Lipase 201 73 - 393 U/L  
CBC WITH AUTOMATED DIFF Collection Time: 11/08/19  1:40 PM  
Result Value Ref Range WBC 18.1 (H) 4.6 - 13.2 K/uL  
 RBC 2.36 (L) 4.70 - 5.50 M/uL HGB 8.3 (L) 13.0 - 16.0 g/dL HCT 24.6 (L) 36.0 - 48.0 % .2 (H) 74.0 - 97.0 FL  
 MCH 35.2 (H) 24.0 - 34.0 PG  
 MCHC 33.7 31.0 - 37.0 g/dL  
 RDW 17.9 (H) 11.6 - 14.5 % PLATELET 556 (L) 559 - 420 K/uL MPV 11.6 9.2 - 11.8 FL  
 NEUTROPHILS 94 (H) 42 - 75 % BAND NEUTROPHILS 1 0 - 5 % LYMPHOCYTES 1 (L) 20 - 51 % MONOCYTES 4 2 - 9 % EOSINOPHILS 0 0 - 5 % BASOPHILS 0 0 - 3 %  
 ABS. NEUTROPHILS 17.2 (H) 1.8 - 8.0 K/UL  
 ABS. LYMPHOCYTES 0.2 (L) 0.8 - 3.5 K/UL  
 ABS. MONOCYTES 0.7 0 - 1.0 K/UL  
 ABS. EOSINOPHILS 0.0 0.0 - 0.4 K/UL  
 ABS. BASOPHILS 0.0 0.0 - 0.06 K/UL  
 DF MANUAL PLATELET COMMENTS ADEQUATE PLATELETS    
 RBC COMMENTS ANISOCYTOSIS 1+ 
    
 RBC COMMENTS POLYCHROMASIA 1+ 
    
 RBC COMMENTS HYPOCHROMIA 2+ 
    
 RBC COMMENTS TARGET CELLS 1+ WBC COMMENTS HYPERSEGMENTED POLYS PROTHROMBIN TIME + INR Collection Time: 11/08/19  1:40 PM  
Result Value Ref Range Prothrombin time 33.5 (H) 11.5 - 15.2 sec INR 3.3 (H) 0.8 - 1.2 AMMONIA Collection Time: 11/08/19  1:40 PM  
Result Value Ref Range Ammonia 37 (H) 11 - 32 UMOL/L  
EKG, 12 LEAD, INITIAL Collection Time: 11/08/19  1:54 PM  
Result Value Ref Range Ventricular Rate 113 BPM  
 Atrial Rate 113 BPM  
 P-R Interval 144 ms QRS Duration 86 ms  
 Q-T Interval 342 ms QTC Calculation (Bezet) 469 ms Calculated P Axis 25 degrees Calculated R Axis -12 degrees Calculated T Axis -10 degrees Diagnosis Sinus tachycardia Possible Anterior infarct , age undetermined Abnormal ECG When compared with ECG of 31-AUG-2017 11:37, 
Vent. rate has increased BY  50 BPM 
QRS duration has decreased Borderline criteria for Anterior infarct are now present TYPE & SCREEN Collection Time: 11/08/19  2:35 PM  
Result Value Ref Range Crossmatch Expiration 11/11/2019 ABO/Rh(D) O POSITIVE Antibody screen NEG Unit number [id number] Blood component type  LR Unit division 00 Status of unit ISSUED Crossmatch result Compatible Unit number [id number] Blood component type  LR,2 Unit division 00 Status of unit ISSUED Crossmatch result Compatible Unit number [id number] Blood component type  LR,2 Unit division 00  Status of unit ISSUED   
 Crossmatch result Compatible Unit number [id number] Blood component type RC LR,2 Unit division 00 Status of unit ISSUED Crossmatch result Compatible Unit number [id number] Blood component type RC LR,2 Unit division 00 Status of unit ALLOCATED Crossmatch result Compatible Unit number [id number] Blood component type RC LR Unit division 00 Status of unit ALLOCATED Crossmatch result Compatible POC LACTIC ACID Collection Time: 11/08/19  5:02 PM  
Result Value Ref Range Lactic Acid (POC) 5.91 (HH) 0.40 - 2.00 mmol/L  
URINALYSIS W/ RFLX MICROSCOPIC Collection Time: 11/08/19  6:11 PM  
Result Value Ref Range Color JANICE Appearance TURBID Specific gravity 1.022 1.005 - 1.030    
 pH (UA) 5.0 5.0 - 8.0 Protein TRACE (A) NEG mg/dL Glucose NEGATIVE  NEG mg/dL Ketone NEGATIVE  NEG mg/dL Bilirubin LARGE (A) NEG Blood LARGE (A) NEG Urobilinogen 1.0 0.2 - 1.0 EU/dL Nitrites POSITIVE (A) NEG Leukocyte Esterase SMALL (A) NEG URINE MICROSCOPIC ONLY Collection Time: 11/08/19  6:11 PM  
Result Value Ref Range WBC 2 to 5 0 - 4 /hpf  
 RBC 1 to 3 0 - 5 /hpf Epithelial cells FEW 0 - 5 /lpf Bacteria 1+ (A) NEG /hpf Mucus 1+ (A) NEG /lpf Amorphous Crystals 2+ (A) NEG Hyaline cast 10 to 20 0 - 2 /lpf POC LACTIC ACID Collection Time: 11/08/19  6:44 PM  
Result Value Ref Range Lactic Acid (POC) 5.46 (HH) 0.40 - 2.00 mmol/L  
HGB & HCT Collection Time: 11/08/19  9:30 PM  
Result Value Ref Range HGB 6.1 (L) 13.0 - 16.0 g/dL HCT 17.8 (LL) 36.0 - 48.0 % HGB & HCT Collection Time: 11/09/19  3:43 AM  
Result Value Ref Range HGB 5.0 (LL) 13.0 - 16.0 g/dL HCT 14.8 (LL) 36.0 - 48.0 % METABOLIC PANEL, COMPREHENSIVE Collection Time: 11/09/19  3:43 AM  
Result Value Ref Range Sodium 143 136 - 145 mmol/L Potassium 5.1 3.5 - 5.5 mmol/L  Chloride 111 100 - 111 mmol/L  
 CO2 20 (L) 21 - 32 mmol/L Anion gap 12 3.0 - 18 mmol/L Glucose 101 (H) 74 - 99 mg/dL BUN 28 (H) 7.0 - 18 MG/DL Creatinine 1.96 (H) 0.6 - 1.3 MG/DL  
 BUN/Creatinine ratio 14 12 - 20 GFR est AA 42 (L) >60 ml/min/1.73m2 GFR est non-AA 35 (L) >60 ml/min/1.73m2 Calcium 7.1 (L) 8.5 - 10.1 MG/DL Bilirubin, total 5.4 (H) 0.2 - 1.0 MG/DL  
 ALT (SGPT) 47 16 - 61 U/L  
 AST (SGOT) 103 (H) 10 - 38 U/L Alk. phosphatase 59 45 - 117 U/L Protein, total 3.2 (L) 6.4 - 8.2 g/dL Albumin 0.9 (L) 3.4 - 5.0 g/dL Globulin 2.3 2.0 - 4.0 g/dL A-G Ratio 0.4 (L) 0.8 - 1.7 PROTHROMBIN TIME + INR Collection Time: 11/09/19  3:43 AM  
Result Value Ref Range Prothrombin time 57.9 (H) 11.5 - 15.2 sec INR 6.7 (HH) 0.8 - 1.2 PTT Collection Time: 11/09/19  3:43 AM  
Result Value Ref Range aPTT 64.8 (H) 23.0 - 36.4 SEC PLASMA, ALLOCATE Collection Time: 11/09/19  4:45 AM  
Result Value Ref Range Unit number [id number] Blood component type FP 24h,Thaw Unit division 00 Status of unit ISSUED No results for input(s): FIO2I, IFO2, HCO3I, IHCO3, HCOPOC, PCO2I, PCOPOC, IPHI, PHI, PHPOC, PO2I, PO2POC in the last 72 hours. No lab exists for component: IPOC2 Telemetry:normal sinus rhythm Imaging: 
I have personally reviewed the patients radiographs and have reviewed the reports: XR Results (most recent): 
Results from Hospital Encounter encounter on 11/08/19 XR CHEST PORT Narrative EXAM:  Chest Portable. INDICATION:  Fluid overload. Ascites. Sepsis. COMPARISON:  11/08/19 TECHNIQUE:  Portable AP chest study FINDINGS:  
  
- Both lungs are hypoinflated. No airspace opacities are noted. - No pleural effusion or pneumothorax is detected. - Cardiac silhouette, mediastinum and hilar regions appear unremarkable allowing 
for the hypoinflation. Impression IMPRESSION: 
 
1. Hyperinflation. 2.  No acute airspace disease. No convincing evidence for significant fluid 
overload. CT Results (most recent): 
Results from Hospital Encounter encounter on 11/08/19 CT ABD PELV WO CONT Narrative EXAM: CT ABDOMEN AND PELVIS WITHOUT CONTRAST CLINICAL HISTORY/INDICATION: Blood in stool, dizziness, Abd pain severe, fever 
or vomiting or leukocytosis, suspect Crohn dz, initial presentation , history of 
cirrhosis, alcohol abuse, hepatitis C, tachycardia COMPARISON: Ultrasound abdomen 10/24/2019. TECHNIQUE: No intravenous contrast was utilized for this helical thin section CT 
of the abdomen and pelvis. Coronal and sagittal reformations obtained. Evaluation of the solid organs, bowel wall, and vascular structures are 
therefore limited. All CT scans at this facility are performed using dose optimization technique as 
appropriate to a performed exam, to include automated exposure control, 
adjustment of the mA and/or kV according to patient's size (including 
appropriate matching for site-specific examinations), or use of iterative 
reconstruction technique. FINDINGS:  
 
Abdomen- The lung bases are clear; no pleural fluid or pneumothorax evident. The included portions of the chest wall and the abdominal wall  are 
unremarkable. The liver is small with a possible micronodular border. Focal hypodensity is 
seen posteriorly in the right lobe of the liver poorly demonstrated. Measurement 
approximately 4 x 3.8 cm, image 26 series 2. The gallbladder is unremarkable. Spleen is unremarkable. Small varicosities are seen in the ventral abdomen. Paraesophageal varicosities are demonstrated. The kidneys appear to be normal in size and morphology. Right intrarenal mid 
pole 5 mm nonobstructing calculus. Image 40. Left renal 1 to 2 mm lower pole 
nonobstructing calculus. Adrenals and Pancreas  are of normal CT appearance. There is no free air. The large and small bowel seem unremarkable. Pelvis - Moderate free fluid. The pelvic viscera are unremarkable. The bladder is incompletely distended but unremarkable. Review of osseous structures throughout on bone window settings shows no 
significant osseous pathology. Impression IMPRESSION:  
 
Moderate to large volume ascites. Poorly defined posterior right intrahepatic mass measuring 4 cm suggested. Small liver. Varicosities. Findings consistent with cirrhosis. Bilateral intrarenal nonobstructing calculus. Report provided to the emergency department at [address] [place] [address]. Total of 45   min critical care time spent at bedside during the course of care providing evaluation,management and care decisions and ordering appropriate treatment related to critical care problems exclusive of procedures. The reason for providing this level of medical care for this critically ill patient was due a critical illness that impaired one or more vital organ systems such that there was a high probability of imminent or life threatening deterioration in the patients condition. This care involved high complexity decision making to assess, manipulate, and support vital system functions, to treat this degree vital organ system failure and to prevent further life threatening deterioration of the patients condition.  
 
Cassie Horn MD

## 2019-11-09 NOTE — PROGRESS NOTES
Briefly seen while central line being placed by ICU team. Patient with alcoholic cirrhosis and acute alcoholic hepatitis presented with melena and acutely decompensated overnight with sanket hematemesis, now intubated. HGB 5.0, INR 6. PRBC and FFP ordered. Octreotide ordered. Will plan for emergent EGD when central line placed and blood products transfused. Full consult to follow. Arlen Rae MD 
[facility] Office/pager - [phone] cell - [phone] 
[email or web address]

## 2019-11-09 NOTE — PROGRESS NOTES
Brief note Code blue called. Dr. Alfonso Bustillo and Dr. Isidoro Grover at bedside on PFM arrival.   
 
Care turned over to Dr. Emilie Bird and her team, PFM will continue to be available as needed. Jae Cisneros MD PGY-3 [facility]

## 2019-11-09 NOTE — ROUTINE PROCESS
RRT called at 3578 for neurologic changes and hypotension post blood bowel movement PFM and MD Harrell at the bedside. Pt given 3 doses of Albumin, 1 NS bolus, 3 units of PRBC's, 1 unit of FFP. Pt has orders for transfer to ICU, awaiting bed assignment. 0629-Pt respiratory arrested, carotid pulse palpable. See navigator and MD notes 0611-Telephone report given to Paola Bob RN. Pt transferred by myself, MILLIE, RT and MD Gordo Mckeon to Main ICU bed 307

## 2019-11-09 NOTE — PROGRESS NOTES
Noted events from this morning RRT called - pt noted to be hypotensive - had a few bloody BM's - dropped Hgb , was seen by Dr Niya Lutz Pt was subsequently transferred to ICU - intubated , is on pressors Continue current management per PCCM team  
Will follow once pt is stable & discharged from ICU

## 2019-11-09 NOTE — PERIOP NOTES
TRANSFER - OUT REPORT: 
 
Verbal report given to Alex Bloom RN(name) on Dasha Para  being transferred to [facility] [phone] for routine post - op Report consisted of patients Situation, Background, Assessment and  
Recommendations(SBAR). Information from the following report(s) SBAR and Procedure Summary was reviewed with the receiving nurse. Lines:  
Peripheral IV 11/08/19 Right Hand (Active) Site Assessment Clean, dry, & intact 11/8/2019 11:16 PM  
Phlebitis Assessment 0 11/8/2019 11:16 PM  
Infiltration Assessment 0 11/8/2019 11:16 PM  
Dressing Status Clean, dry, & intact; Occlusive 11/8/2019 11:16 PM  
Dressing Type Tape;Transparent 11/8/2019 11:16 PM  
Hub Color/Line Status End cap changed; Flushed 11/8/2019 11:16 PM  
Action Taken Open ports on tubing capped 11/8/2019 11:16 PM  
Alcohol Cap Used Yes 11/8/2019 11:16 PM  
   
Peripheral IV 11/08/19 Left Hand (Active) Site Assessment Clean, dry, & intact 11/8/2019 11:16 PM  
Phlebitis Assessment 0 11/8/2019 11:16 PM  
Infiltration Assessment 0 11/8/2019 11:16 PM  
Dressing Status Clean, dry, & intact; Occlusive 11/8/2019 11:16 PM  
Dressing Type Tape;Transparent 11/8/2019 11:16 PM  
Hub Color/Line Status Pink;End cap changed; Flushed 11/8/2019 11:16 PM  
Action Taken Open ports on tubing capped 11/8/2019 11:16 PM  
Alcohol Cap Used Yes 11/8/2019 11:16 PM  
   
Peripheral IV 11/09/19 Anterior;Right Forearm (Active) Opportunity for questions and clarification was provided.

## 2019-11-09 NOTE — PROCEDURES
[facility] Pulmonary Specialist  Arterial  Line Procedure Note Indication: need for vasopressors, respiratory failure Risks, benefits, alternatives explained and consent not obtained, patient critical.   
 
Line Bundle: 
 Full sterile barrier precautions used. 7-Step Sterility Protocol followed. (cap, mask sterile gown, sterile gloves, large sterile sheet, hand hygiene, 2% chlorhexidine for cutaneous antisepsis) 5 mL 1% Lidocaine placed at insertion site. Left femoral cannulated x 2 attempt(s) and right radial x1 attempt utilizing the modified Seldinger technique. Unable to thread wire each attempt, wire retracted and markedly kinked. Patient likely w/ very torturous anatomy. Procedure abandoned. Appropriate pressure applied to each site. Rachel Huang PA-C 
11/09/19 Pulmonary, Critical Care Medicine [facility] Late entry for date of service 11/9/2019: 
I was present for and supervised the entire procedure. See PA note for details. 
 
Rashmi Nguyễn MD/MPH Pulmonary, Critical Care Medicine [facility]

## 2019-11-09 NOTE — PROGRESS NOTES
Rapid Response Note [address] Patient: Elza Arredondo 62 y.o. male [id number] 
1955 Admit Date: 11/8/2019 Admission Diagnosis: Ascites [R18.8] Sepsis ([address] [A41.9] UTI (urinary tract infection) [N39.0] RAPID RESPONSE Rapid response called for altered mental status. Nurse reports pt here for GI bleed had a large BM with BRBPR. She said immediately afterwards, pt was awake but not responding to her questions and not oriented. Once I arrived the patient was oriented x3 and in and out of sleep to answer questions. Pt endorsed some abdominal pain, nausea and drowsiness but denied CP or SOB Dr. Bruce Franks arrived shortly after us and assisted with the rapid. Medications Reviewed OBJECTIVE Patient Vitals for the past 12 hrs: 
 Temp Pulse Resp BP SpO2  
11/09/19 0417    100/68   
11/09/19 0347 97.2 °F (36.2 °C) (!) 118 20 91/67 98 % 11/09/19 0317  (!) 115  97/67   
11/09/19 0217 98.2 °F (36.8 °C) (!) 108 20 91/50 98 % 11/09/19 0107 99.4 °F (37.4 °C) (!) 110 16 119/81 98 % 11/09/19 0011 98.6 °F (37 °C) (!) 106 20 137/82 98 % 11/08/19 2335 98.9 °F (37.2 °C) (!) 104 18 119/75 99 % 11/08/19 2316 98.4 °F (36.9 °C) (!) 105 20 116/73 99 % 11/08/19 2301 98.6 °F (37 °C) (!) 105 18 (!) 135/105 99 % 11/08/19 2215  (!) 101 15 110/52   
11/08/19 2130  (!) 106 13 118/71   
11/08/19 1630  (!) 111 13 119/72  PHYSICAL: 
General:  Pale, in and out of sleep, consistently waking to answer questions CV:  RRR, no murmurs, rubs, or gallops. RESP:  Unlabored breathing. CTAB. ABD:  Soft, nontender, distended. No suprapubic tenderness. Neuro: A+Ox3. UE 5/5 strength, cranial nerves grossly in tact, ASSESSMENT, PLAN & DISPOSITION Elza Arredondo is a 62y.o. year old male admitted for Ascites [R18.8] Sepsis ([facility] [A41.9] UTI (urinary tract infection) [N39.0]. Rapid response called for AMS 2/2 large volume BRBPR. Pt admitted originally with GI bleed hemoglobin on admission 8.3 trended down to 6.1 so he received a unit of blood. He then had this new episode of BRBPR and his Hemoglobin was 5.0 so two more units were ordered and given. He was acutely hypotensive as low as 70/44 and tachycardic to 120 bmp. Albumin 25% 25g X 2 was given along with the blood and a 1 L bolus of NS. The patient was ordered for a protonix drip as well as a 1 time 80 mg IV dose during the rapid. Patient was put in trendelenburg and with the fluids his BP was trending up to 109/80. Patient condition currently: guarded. Medications Administered: 2 units PRBCs, 1 L NS, 50 g 25% albumin Labs ordered/Pending: CBC, CMP, coags, CXR, ammonia Disposition: stepdown Attending Sharri present during most of rapid response. In agreement with plan. Primary team resuming care. Senior resident Justyna Ley present during RRT and evaluation Ness Gonzalez MD  
[address] Medicine PGY-1 
4:17 AM 11/09/19

## 2019-11-09 NOTE — PROCEDURES
[facility] Pulmonary Specialist 
[facility] Procedure Note With Abdon Indication: Need for vasopressors Risks, benefits, alternatives explained and consent obtained. Time out performed. Patient positioned in Trendelenburg. Central line Bundle: 
Full sterile barrier precautions used. 7-Step Sterility Protocol followed. (cap, mask sterile gown, sterile gloves, large sterile sheet, hand hygiene, 2% chlorhexidine for cutaneous antisepsis) 5 mL 1% Lidocaine placed at insertion site. Using ultrasound guidance, Right femoral vein cannulated x 1 attempt(s) utilizing the modified Seldinger technique. Position of guidewire confirmed in vein using ultrasound prior to dilating. Guidewire was removed. Central venous catheter was placed without difficulty. no immediate complications encountered. Good blood return on all 3 ports. Catheter secured & Biopatch applied. Sterile Tegaderm placed. Cuauhtemoc Levi PA-C 
11/09/19 Pulmonary, Critical Care Medicine [facility] Late entry for date of service 11/9/2019: 
I was present for and supervised the entire procedure. See PA note for details Glen Rodríguez MD/MPH Pulmonary, Critical Care Medicine [facility]

## 2019-11-09 NOTE — PROCEDURES
[employer] 
[phone] Endoscopic Gastroduodenoscopy Procedure Note Ginette De La Vega 1955 
[id number] Indication: 1. Massive UGIB  2. Cirrhosis : Heather Guaman MD 
 
Surgical Assistants: none Referring Provider:  None Anesthesia/Sedation:  None Procedure Details After infomed consent was obtained for the procedure, with all risks and benefits of procedure explained the patient was taken to the endoscopy suite and placed in the left lateral decubitus position. Following sequential administration of sedation as per above, the endoscope was inserted into the mouth and advanced under direct vision to stomach. A careful inspection was made as the gastroscope was withdrawn, including a retroflexed view of the proximal stomach; findings and interventions are described below. Findings:  
Esophagus:copious red blood, unable to clear despite aggressive lavage and suction, no obvious source seen, however highly suspect esophageal variceal bleeding as etiology, unable to visualize culprit varix or region of suspicion to blindly place band Stomach: large volume red blood, but did not appear as active as the esophagus, unable to visualize cardia or fundus Duodenum/jejunum: not visualized Therapies:  none Specimens: * No specimens in log * Complications:   None; patient tolerated the procedure well. EBL:  None. Tissue Implant Device: None Impression:    1. Active UGIB, likely esophageal varices in etiology Recommendations: 
-Aggressive transfusion of PRBC and FFP - mass transfusion protocol would be appropriate for this patient 
-Continue octreotide and vasopressin 
-Discussed with IR, no available therapeutic options in this highly unstable patient with MELD 41 Guarded prognosis for this very ill patient. Following with you.  
 
Heather Guaman MD 
11/9/2019  10:02 AM 
 
Heather Guaman MD 
 [facility], [facility] Office/pager - [phone] cell - [phone] 
[email or web address]

## 2019-11-09 NOTE — PROGRESS NOTES
Patient had another bloody bowel movement with continuous drop in H&H Currently 6.1/17.8 Patient is hypotensive and tachycardic We will transfuse 2 units of packed RBCs Has low albumin of 1.6>> we will give 4 doses of albumin IV 25%

## 2019-11-09 NOTE — PROCEDURES
[facility] Pulmonary Specialist  Arterial  Line Procedure Note Indication: Massive GI Hemorrhage, likely esophageal varies; Emergent procedure; consent not obtained. Line Bundle: 
 Full sterile barrier precautions used. 7-Step Sterility Protocol followed. (cap, mask sterile gown, sterile gloves, large sterile sheet, hand hygiene, 2% chlorhexidine for cutaneous antisepsis) 3 mL 1% Lidocaine placed at insertion site. With Ultrasound guidance, Right Axillary artery cannulated x 2 attempt(s) utilizing the modified Seldinger technique. Emergent line placement, guidewire confirmed with ultrasound however unable to obtain image. Good, bright red, pulsatile blood return. Catheter secured & Biopatch applied. Sterile Tegaderm placed. Aileen Wan PA-C  
11:45 AM 
 
 
Pulmonary Critical Care Medicine [facility] Late entry for date of service 11/9/2019: 
 
I was present for and supervised the entire procedure. See PA note for details Virginia Elder MD/MPH Pulmonary, Critical Care Medicine [facility]

## 2019-11-09 NOTE — PROGRESS NOTES
Patient's family  is not available for follow up visit at this time. [address] [place] [address]  
[phone]

## 2019-11-09 NOTE — PERIOP NOTES
Pre procedure assessments completed prior to procedure being done, however due to the need for several team members to care for the patient at the same time, no computer was available to document until after leaving the case. Consent signed by Dr. Cr Burgess and Dr. Clau Gillette for 2 physician signature emergency and witnessed by NUBIA Pike RN.

## 2019-11-09 NOTE — PROCEDURES
[facility] Pulmonary Specialist 
[facility] Procedure Note With Abdon Indication: Inadequate venous access; need for additional access for blood transfusion and pressors Emergent procedure; no consent obtained. Dr. Hans Mendoza at bedside to assist. 
  
Time out performed. Central line Bundle: 
Full sterile barrier precautions used. 7-Step Sterility Protocol followed. (cap, mask sterile gown, sterile gloves, large sterile sheet, hand hygiene, 2% chlorhexidine for cutaneous antisepsis) 5 mL 1% Lidocaine placed at insertion site. Using ultrasound guidance, Left femoral vein cannulated x 3 attempt(s) utilizing the modified Seldinger technique. Femoral site chose due to severe GI hemorrhage emergent nature of procedure in setting of esophageal varices. Position of guidewire confirmed in vein using ultrasound prior to dilating. Guidewire was removed. Dr. Hans Mendoza at bedside and also visually confirmed guidewire in vein on ultrasound prior to dilating. Due to technical difficulties, ultrasound picture not obtained. Central venous catheter was placed with mild difficulty 2/2 tortuous anatomy and obese body habitus. No immediate complications encountered. Good, dark rend non-pulsatile blood return on all 3 ports. Catheter secured & Biopatch applied. Sterile Tegaderm placed. Fabien Edge PA-C 
11/09/19 Pulmonary Critical Care Medicine [facility] Late entry for date of service 11/9/2019: 
I was present for and supervised the entire procedure. See PA note for details. Parish Chin MD/MPH Pulmonary, Critical Care Medicine [facility]

## 2019-11-09 NOTE — PROGRESS NOTES
Problem: Upper and Lower GI Bleed: Day 1 Goal: Off Pathway (Use only if patient is Off Pathway) Outcome: Progressing Towards Goal 
Goal: Activity/Safety Outcome: Progressing Towards Goal 
Goal: Consults, if ordered Outcome: Progressing Towards Goal 
Goal: Diagnostic Test/Procedures Outcome: Progressing Towards Goal 
Goal: Nutrition/Diet Outcome: Progressing Towards Goal 
Goal: Discharge Planning Outcome: Progressing Towards Goal 
Goal: Medications Outcome: Progressing Towards Goal 
Goal: Respiratory Outcome: Progressing Towards Goal 
Goal: Treatments/Interventions/Procedures Outcome: Progressing Towards Goal 
Goal: Psychosocial 
Outcome: Progressing Towards Goal 
Goal: *Optimal pain control at patient's stated goal 
Outcome: Progressing Towards Goal 
Goal: *Hemodynamically stable Outcome: Progressing Towards Goal 
Goal: *Demonstrates progressive activity Outcome: Progressing Towards Goal

## 2019-11-09 NOTE — ANESTHESIA PROCEDURE NOTES
Emergent Intubation Performed by: Chapincito Jacobs CRNA Authorized by: Chapincito Jacobs CRNA Emergent Intubation:  
Patient location: CVT Stepdown. Date/Time:  11/9/2019 6:30 AM 
Indications:  Impending respiratory failure Spontaneous Ventilation: present Level of Consciousness: unresponsive Preoxygenated: Yes Airway Documentation: Airway:  ETT - Cuffed Technique:  Rapid sequence Blade Type:  Gómez Blade Size:  4 ETT size (mm):  8.0 ETT Line Fabien:  Lips ETT Insertion depth (cm):  24 Placement verified by: auscultation, EtCO2 and BBS Attempts:  1 Difficult airway: No   
Intubation Note Called to bedside secondary to  impending respiratory failure. Patient pre-oxygenated with 100% oxygen. Smooth RSI with Propofol 80 mg, rocuronium 30 mg. DVL x 1 Patient intubated with8.0 Regular/[place] ETT taped and secured at 24 cm at the teeth. 
 
+ Bilateral BS, + Chest rise, + ETCO2. CXR pending. There were no vitals taken for this visit. Alvarez Cuellar CRNAIntubation Note Called to bedside secondary to  impending respiratory failure. Patient pre-oxygenated with 100% oxygen. Smooth RSI with Propofol 80 mg, rocuronium 30 mg. DVL x 1 Patient intubated with 8.0 Regular/[place] ETT taped and secured at 24 cm at the teeth. 
 
+ Bilateral BS, + Chest rise, + ETCO2. CXR pending. There were no vitals taken for this visit. Alvarez Cuellar CRNA

## 2019-11-09 NOTE — PROGRESS NOTES
1237:  
TRANSFER - IN REPORT: 
 
Verbal report received from Honorio Franco, [facility] (name) on Sultana Melgar  being received from CVT Stepdown (unit) for urgent transfer Report consisted of patients Situation, Background, Assessment and  
Recommendations(SBAR). Information from the following report(s) SBAR, ED Summary, Procedure Summary, Intake/Output, MAR and Recent Results was reviewed with the receiving nurse. Opportunity for questions and clarification was provided. Assessment completed upon patients arrival to unit and care assumed. 7:27 PM 
Bedside and Verbal shift change report given to Gita Vicente RN (oncoming nurse) by Kacey Christensen RN (offgoing nurse). Report included the following information SBAR, ED Summary, Procedure Summary, Intake/Output, MAR and Recent Results.

## 2019-11-10 NOTE — DISCHARGE SUMMARY
Death Summary Patient: Daniela Nuñez               Sex: male          DOA: 11/8/2019 YOB: 1955      Age:  62 y.o.        LOS:  LOS: 2 days Admit Date: 11/8/2019 Discharge Date: 11/10/2019 Admission Diagnoses: Ascites [R18.8] Sepsis ([facility] [A41.9] UTI (urinary tract infection) [N39.0] Discharge Diagnoses:   
Problem List as of 11/10/2019 Date Reviewed: 4/8/2019 Codes Class Noted - Resolved Secondary esophageal varices with bleeding (HCC) ICD-10-CM: I85.11 ICD-9-CM: 456.20  11/9/2019 - Present Alcoholic cirrhosis ([facility] AIMEE-37-: K70.30 ICD-9-CM: 571.2  11/9/2019 - Present UTI (urinary tract infection) ICD-10-CM: N39.0 ICD-9-CM: 599.0  11/8/2019 - Present Ascites ICD-10-CM: R18.8 ICD-9-CM: 789.59  11/8/2019 - Present Sepsis ([facility] ICD-10-CM: A41.9 ICD-9-CM: 038.9, 995.91  11/8/2019 - Present Aortic valve sclerosis ICD-10-CM: I35.8 ICD-9-CM: 424.1  6/5/2018 - Present Sleep apnea in adult ICD-10-CM: G47.30 ICD-9-CM: 327.23  12/27/2017 - Present Obesity (BMI 30.0-34.9) ICD-10-CM: G48.8 ICD-9-CM: 278.00  12/27/2017 - Present ED (erectile dysfunction) of organic origin ICD-10-CM: N52.9 ICD-9-CM: 607.84  12/27/2017 - Present Benign prostatic hyperplasia ICD-10-CM: N40.0 ICD-9-CM: 600.00  12/27/2017 - Present Chlamydia ICD-10-CM: A74.9 ICD-9-CM: 079.98  12/27/2017 - Present Hepatitis C virus infection without hepatic coma ICD-10-CM: B19.20 ICD-9-CM: 070.70  12/27/2017 - Present Thrombocytopenia ([facility] ICD-10-CM: D69.6 ICD-9-CM: 287.5  1/6/2016 - Present Subdural hematoma ([facility] ICD-10-CM: M35.0S2K 
ICD-9-CM: 432.1  1/2/2016 - Present Hypertension ICD-10-CM: I10 
ICD-9-CM: 401.9  1/2/2016 - Present Alcohol abuse ICD-10-CM: F10.10 ICD-9-CM: 305.00  1/2/2016 - Present Liver enzyme elevation ICD-10-CM: R74.8 ICD-9-CM: 790.5  2016 - Present Discharge Condition:   Hospital Course: 59-year-old male with EtOH abuse, hep C, decompensated cirrhosis with ascites, history of CVA, MARIBEL, thrombocytopenia presented to [facility] ER with complaints of generalized shortness of breath and fatigue and loss of appetite. Patient had been gaining weight at home and been seen by GI as outpatient. Patient presented with dark tarry stools melena, hematochezia hypotension, tachycardia. Patient received multiple blood transfusion FFP, PRBC albumin. Patient was on abx for concern for sepsis. Patient remained hypotrensive/tachycardic. Patient has a CODE BLUE on the floor and was brought to ICU Patient was showing signs of hemorraghic shock. Patient under went EGD unable to find source of bleeding given the amount of blood. IR was contact and was too unstable for TIPS etc. Patient had a CVL and A line placed for infusions and monitoring. Cont to get blood transfusions with cont signs of bleeding. Attempt Blakemore tube was unable to control bleeding. No family was available and all contact was with documented close friends. Patient cont to decompensate unable to maintain BP or stop bleeding. Patient went into PEA arrest with initiation of ACLS guidlines and was unable to be resuscitated. Significant Diagnostic Studies:  
CT abd/pelvis  IMPRESSION:  
  
Moderate to large volume ascites. Poorly defined posterior right intrahepatic mass measuring 4 cm suggested. Small liver. Varicosities. Findings consistent with cirrhosis. Bilateral intrarenal nonobstructing calculus CXR 11/10/19 IMPRESSION: 
  
Blakemore tube reportedly placed. Balloon inflated at distal esophagus. ET tube 3.5 cm above zac. New moderate-large right pleural effusion with increasing atelectasis in the 
right lung. Cardiomegaly. EGD  Findings: Esophagus:copious red blood, unable to clear despite aggressive lavage and suction, no obvious source seen, however highly suspect esophageal variceal bleeding as etiology, unable to visualize culprit varix or region of suspicion to blindly place band Stomach: large volume red blood, but did not appear as active as the esophagus, unable to visualize cardia or fundus Duodenum/jejunum: not visualized Intubation 11/9 A-line placement 11/9 CVL 11/9 Consults:  
 Treatment Team: Attending Provider: Cammy Ortiz MD; Consulting Provider: Cammy Ortiz MD; Consulting Provider: Nathalia Caldwell MD; Utilization Review: Xiomara Santoyo RN; Primary Nurse: Gisela Fink RN Time of Death: 26 Cause of death:  Hemorrhagic shock Secondary causes:  Decompensated cirrhosis, coagulopathy, hepatic encephalopathy, severe sepsis Jeannette Larson PA-C 
11/10/2019 
11:03 AM 
 
 
I have discussed the discharge management with the APC and we have worked collaboratively to manage the patient's discharge. I have examined the patient independently and I agree with the Discharge Summary by the [facility] TREATMENT [facility] Total time spent including time spent on final examination and discharge discussion, discharge documentation and records reviewed and medication reconciliation: > 30 minutes Estephanie Horton MD/MPH Pulmonary, Critical Care Medicine [facility] Specialists

## 2019-11-10 NOTE — ROUTINE PROCESS
Bedside and Verbal shift change report given to Naseem Broussard RN (oncoming nurse) by Fiorella Sun RN (offgoing nurse). Report included the following information SBAR, Procedure Summary, Intake/Output, MAR, Recent Results, Med Rec Status, Cardiac Rhythm (NSR) and Alarm Parameters .

## 2019-11-10 NOTE — PROGRESS NOTES
Unable to obtain accurate sats on patient due to vasoconstriction 11/10/19 0815 Patient Observations Pulse (Heart Rate) 80 Resp Rate 16 ETCO2 (mmHg) 9 mmHg Airway - Continuous Aspiration of Subglottic Secretions (NOA) Tube 11/09/19 Oral  
Placement Date/Time: 11/09/19 7241   Number of Attempts: 1  Present on Admission/Arrival: Yes  Location: Oral  Placement Verified: Auscultation;BBS;Chest x-ray  Airway Types: Endotracheal, cuffed  Airway Tube Size: 8 mm Insertion Depth (cm) 25 cm Line Fabien Lips Side Secured Centered Cuff Pressure  
(mlt) Site Assessment Drainage (comment) Suction on Yes Amt Secretions Aspirated (mL)  
(copious blood) Respiratory Respiratory (WDL) X Airway Clearance Suction ET Tube Suction Device Inline suction catheter Sputum Method Obtained Endotracheal  
Sputum Amount Copious Sputum Color/Odor Bloody Sputum Consistency Thick Ventilator Initiate/Discontinue Bio-Med ID # [id number] Vent Settings FIO2 (%) 100 % CMV Rate Set 16 Back-Up Rate 16 Vt Set (ml) 450 ml PEEP/VENT (cm H2O) 8 cm H20 Insp Time (sec) 0.9 sec Insp Rise Time % 50 % Flow Trigger 3 Ventilator Measurements Resp Rate Observed 16 Vt Exhaled (Machine Breath) (ml) 486 ml Ve Observed (l/min) 7.85 l/min PIP Observed (cm H2O) 36 cm H2O Plateau Pressure (cm H2O) 30 cm H2O  
MAP (cm H2O) 15 I:E Ratio Actual 1:3.2 Auto PEEP Observed (cm H2O) 0 cm H2O Safety & Alarms Circuit Temperature 98.6 °F (37 °C) Backup Mode Checked/Apnea Yes Pressure Max 40 cm H2O Pressure Min 14 cm H2O Ve Min 20 Ve Max 20 Vt Min 200 ml Vt Max 800 ml  
RR Max 40 Ambu Bag Yes Ambu Mask Yes ETCO2/TCM Min 20 mmHg ETCO2/TCM Max 50 mmHg Weaning Parameters Spontaneous Breathing Trial Complete No (Comments) Age Specific Ventilator Associated Pneumonia Bundle Patient Age Group Adult Adult Ventilator Associated Pneumonia Bundle Elevation of Head to 30-45 Degrees (Unless Contraindicated) Yes Assessment of Readiness to Extubate No Vent Dependent Vent Method/Mode Ventilation Method Conventional  
Ventilator Mode Assist control;VC+ Pulmonary Toilet Pulmonary Toilet H. O.B elevated;Suction

## 2019-11-10 NOTE — PROGRESS NOTES
Family friend called with number for family. Attempt to contact daughter Next of Kin left message to call back. Daughter Catherene Ganser [phone] Additional number given by family friend Brother Bucky Liu [phone]  went to fax Sister Yasmine Morris [phone] wrong person on VM

## 2019-11-10 NOTE — PROGRESS NOTES
Bedside and Verbal shift change report given to Alex Bloom RN (oncoming nurse) by Valerie Cueva RN (offgoing nurse). Report included the following information SBAR, ED Summary, Procedure Summary, Intake/Output, MAR and Recent Results. Unable to obtain pulse oximetry reading due to vasoconstriction. Site attempted on forehead and ears. Paradise Chamberlain, [facility] notified. No further orders at this time. 9:09 AM 
BP: 47/21. Mack Galvan, [facility] notified. No further orders at this time. Nurse at bedside- will continue to monitor. iVneet Encinas from [place] contacted- pt. Is not a candidate for organ donation at this time. 1004: code blue called and CPR initiated. See code note for details. 1022: time of death cuba hughes at bedside. 1030: Debra Pitts, Emergency contact, called to follow up with contacting family. No family contact made or phone numbers available per Debra Pitts at this time. 10:57 AM: Dalila Dia, called from [address] [place] [address] to report time of death- pt. Is not a candidate for tissue or eye donation. Post mortem care performed.

## 2019-11-10 NOTE — PROGRESS NOTES
PCCM Interval note: 
 
 
72-year-old male with EtOH abuse, hep C, decompensated cirrhosis with ascites, history of CVA, MARIBEL, thrombocytopenia, presented to [facility] ER with complaints of generalized shortness of breath and fatigue and loss of appetite. Patient seen his gastroenterologist about a week ago and is been started on prednisone as well as Lasix and Aldactone, patient noted a 30 pound weight gain over the last 2 to 3 weeks with increased abdominal girth. Patient noted on the day of presentation that he had a dark tarry black stool, last colonoscopy about 3 months ago. Patient noted progressively worsening this and fatigue over the last few weeks, with poor appetite, decreased p.o. intake. Patient admitted with GI bleed, noted to have melena and hematochezia with hypotension and tachycardia, given transfusion overnight, also noted to have hypoalbuminemia, given IV albumin. Patient also being treated for sepsis concerns for SBP versus UTI. On the floor overnight, patient became increasingly hypotensive consistent with hemorrhagic shock given hemoglobin of 5.0 despite transfusion 2 units overnight. Patient then had CODE BLUE called this morning for respiratory arrest secondary to acute encephalopathy, multifactorial, including hepatic encephalopathy given elevated ammonia, and sepsis, and hemorrhagic shock. Patient was intubated emergently brought over to the ICU. I quickly placed a large bore central line, and aggressively transfuse the patient about 4 units PRBCs within a few minutes. Patient was on levophed as well as been given IV fluids. Patient has been unfortunately unable to be weaned off of vasopressors despite very aggressive transfusion, patient is received about 18 to 24 units PRBCs, as well as FFP, platelets, cryoprecipitate in order to avoid dilutional anemia.   Patient had elevated INR near 6, with a calculated meld score 41, status post transfusion of FFP and given vitamin K, now INR less than 2. GI was consulted, attempted emergent scope, however too much bleeding from oral cavity, unable to visualize source of bleeding. I spoke with Dr. Romero, she consulted interventional radiology, Dr. Elkin Werner who declined any potential intervention such as TIPS given patient's poor prognosis and instability. Patient has developed worsening metabolic acidosis, required multiple administrations of sodium bicarb. Also given significant transfusion, patient also hypocalcemic, given multiple administrations of calcium chloride and gluconate given low ionized calcium. Patient does not have any family available at bedside, unable to reach patient's family. Patient's prognosis is extremely poor, morbid. Since patient continues to have bleeding, suspected esophageal varices, will continue octreotide, vasopressin at higher dose, PPI drip, and I discussed the case with Dr. Romero, who recommended placement of synced and Blakemore tube to try to achieve hemostasis. I attempted Cleotha Furnas tube placement after tube was procured from outside facility. Unfortunately, with entire Blakemore tube placed beyond 50 cm, closer to 60 cm, gastric balloon only reaches distal esophagus. Also manometer unavailable to use esophageal balloon. Thus, Blakemore tube deflated, will continue OG decompression. Prognosis Is very poor/morbid Total of 82 min critical care time spent at bedside during the course of care providing evaluation,management and care decisions and ordering appropriate treatment related to critical care problems exclusive of procedures.  
The reason for providing this level of medical care for this critically ill patient was due a critical illness that impaired one or more vital organ systems such that there was a high probability of imminent or life threatening deterioration in the patients condition. This care involved high complexity decision making to assess, manipulate, and support vital system functions, to treat this degree vital organ system failure and to prevent further life threatening deterioration of the patients condition. Zhou Sheth MD/MPH Pulmonary, Critical Care Medicine [address] [place] [address] [facility]

## 2019-11-10 NOTE — PROGRESS NOTES
[email or web address] 
[phone] Gastroenterology follow up-Progress note Impression: 1. Massive upper GI bleed suspected to be from EV- underwent emergent EGD yesterday- unable to clear esophagus from blood for evaluation. Not a candidate for TIPS or IR intervention per IR. Attempted Blakemore tube- ultimately failed as gastric balloon would no go past distal esophagus and manometer was not available. OGT in place- clamped at present; continues to bleed. He is on max doses of vasopressin/epinephrine/norepinephrine with SBP via gonzalo in 40s. 2. Severe anemia with hypovolemic shock secondary to above; underwent massive transfusions yesterday - 18-24 units PRBC in addition to platelets, cryo, and FFP 3. Liver mass/lesion; new finding; suspect [address] 4. Ascites due to portal HTN 
5. Alcoholic hepatitis- noted last week and started on prednisolone treatment 6. Cirrhosis- alcoholic/HCV- MELD 41 yesterday; decompensated and appears to have fulminant liver failure Plan: 1. Best supportive care with vasopressors, blood transfusions 2. Continue octreotide gtt 3. Continue protonix gtt 4. D/W Dr. Girish Zamarripa- given patient status and hemodynamic instability, not a candidate for repeat EGD. Patient's prognosis is grim at this point. ADDENDUM:  1005: present in ICU when patient went into cardiac arrest.   ICU team provided CPR/ACLS per guidelines. Patient ultimately . Chief Complaint: GI bleed Subjective:  Unresponsive on ventilator ROS: unable to obtain Eyes: conjunctiva normal, EOM normal  
Neck: ROM normal, supple and trachea normal  
Cardiovascular: heart normal, intact distal pulses, normal rate and regular rhythm Pulmonary/Chest Wall: breath sounds normal and effort normal- on vent Abdominal: + ascites; mottling noted on lower abd, lower legs Patient Active Problem List  
Diagnosis Code  Subdural hematoma ([facility] [address] E57.1J1R  Hypertension I10  
  Alcohol abuse F10.10  Liver enzyme elevation R74.8  Thrombocytopenia ([address] D69.6  Sleep apnea in adult G47.30  Obesity (BMI 30.0-34. 9) E66.9  
 ED (erectile dysfunction) of organic origin N52.9  Benign prostatic hyperplasia N40.0  Chlamydia A74.9  Hepatitis C virus infection without hepatic coma B19.20  Aortic valve sclerosis I35.8  UTI (urinary tract infection) N39.0  Ascites R18.8  Sepsis ([address] A41.9  Secondary esophageal varices with bleeding (HCC) V87.34  
 Alcoholic cirrhosis (HCC) Z71.16 Visit Vitals BP (!) 60/26 Comment: ICU team notified- see MAR for vasoactive titration Pulse 79 Temp 98.2 °F (36.8 °C) Resp 16 Ht 5' 7.99\" (1.727 m) Comment: previous encounter Wt 132 kg (291 lb 0.1 oz) SpO2 (!) 32% BMI 44.26 kg/m² Intake/Output Summary (Last 24 hours) at 11/10/2019 1708 Last data filed at 11/10/2019 0830 Gross per 24 hour Intake 56352.82 ml Output 180 ml Net 39804.82 ml CBC w/Diff Lab Results Component Value Date/Time WBC 8.9 11/10/2019 12:50 AM  
 RBC 2.04 (L) 11/10/2019 12:50 AM  
 HGB 6.1 (L) 11/10/2019 06:15 AM  
 HCT 19.2 (L) 11/10/2019 06:15 AM  
 MCV 92.2 11/10/2019 12:50 AM  
 MCH 29.4 11/10/2019 12:50 AM  
 MCHC 31.9 11/10/2019 12:50 AM  
 RDW 15.6 (H) 11/10/2019 12:50 AM  
 PLT 64 (L) 11/10/2019 12:50 AM  
 Lab Results Component Value Date/Time GRANS 73 11/10/2019 12:50 AM  
 LYMPH 15 (L) 11/10/2019 12:50 AM  
 EOS 0 11/10/2019 12:50 AM  
 BANDS 5 11/10/2019 12:50 AM  
 BASOS 0 11/10/2019 12:50 AM  
  
Basic Metabolic Profile Recent Labs 11/10/19 
0050 *  
K 5.7*  
* CO2 9*  
BUN 26*  
CA 8.0*  
MG 2.0 PHOS 10.0* Hepatic Function Lab Results Component Value Date/Time ALB 1.3 (L) 11/10/2019 12:50 AM  
 TP 2.3 (L) 11/10/2019 12:50 AM  
 AP 28 (L) 11/10/2019 12:50 AM  
 Lab Results Component Value Date/Time SGOT 549 (H) 11/10/2019 12:50 AM  
  
 
 
Coags Recent Labs 11/10/19 
0050 11/09/19 
2102 PTP 43.0* 28.6* INR 4.6* 2.7* APTT 114.2* 66.5* Fredrick Rene NP [facility]. [facility]/jack Phone: [phone]

## 2019-11-10 NOTE — PROGRESS NOTES
[address] [facility] Death Pronouncement Patient: German Phan MRN: [id number]  CSN: [id number] YOB: 1955  Age: 62 y.o. Sex: male Patient lying in bed, mouth open, eyes closed. Verified identity via bracelet. Pupils fixed and equal bilaterally. No response to verbal or painful stimuli. No heart or lung sounds auscultated. Chest rise not visualized. No carotid or peripheral pulses. Death officially pronounced at 10:22, 11/10/2019 Alfredo Srivastava, PGY-1  
[facility] Medicine Intern Pager: [phone] November 10, 2019, 10:26 AM

## 2019-11-10 NOTE — PROGRESS NOTES
Gogo Murrieta is a 62 y.o. male Patient's initial status Code blue. CPR and BVM assisted respiration initiated at 1004. Patient's chart was reviewed and discussed with the patient's nurse. Initial rhythm: PEA Patient was already intubated on vent ACLS guidelines were followed per code document. Course of code included CPR Patient on Epi/Bicarb gtt. 1 amps of bicrab and 1 amps of Epi given since gtts where actively  running Degradation of PEA to asystole corresponded on a-line and doppler no pulse between pulse checks Outcome of Code:  Current vital signs are Blood pressure 0/0, pulse 0, respiratory rate 0, SpO2 0%. Disposition  . [phone] Discussed condition with: 
[x]nursing []PT/OT [x]respiratory therapy [x]Dr. Major [x]family none avaiable [] Only interaction is with close friends as documented in chart no family information available friends notified of his passing  ZAINAB Hernandez

## 2019-11-10 NOTE — PROGRESS NOTES
Problem: Falls - Risk of 
Goal: *Absence of Falls Description Document Bandar Pineda Fall Risk and appropriate interventions in the flowsheet. Outcome: Progressing Towards Goal 
Note:  
Fall Risk Interventions: 
Mobility Interventions: Communicate number of staff needed for ambulation/transfer Mentation Interventions: Door open when patient unattended, Reorient patient Medication Interventions: Bed/chair exit alarm, Assess postural VS orthostatic hypotension Elimination Interventions: Bed/chair exit alarm, Call light in reach, Toileting schedule/hourly rounds Problem: Ventilator Management Goal: *Adequate oxygenation and ventilation Outcome: Progressing Towards Goal 
  
Problem: Pressure Injury - Risk of 
Goal: *Prevention of pressure injury Description Document Juan José Scale and appropriate interventions in the flowsheet. Outcome: Progressing Towards Goal 
Note:  
Pressure Injury Interventions: 
Sensory Interventions: Assess changes in LOC, Check visual cues for pain, Keep linens dry and wrinkle-free, Minimize linen layers, Monitor skin under medical devices, Turn and reposition approx. every two hours (pillows and wedges if needed) Moisture Interventions: Absorbent underpads, Apply protective barrier, creams and emollients, Contain wound drainage, Internal/External urinary devices, Minimize layers Activity Interventions: Pressure redistribution bed/mattress(bed type) Mobility Interventions: HOB 30 degrees or less, Pressure redistribution bed/mattress (bed type), Turn and reposition approx. every two hours(pillow and wedges) Nutrition Interventions: Document food/fluid/supplement intake Friction and Shear Interventions: Apply protective barrier, creams and emollients, Foam dressings/transparent film/skin sealants, HOB 30 degrees or less, Lift team/patient mobility team, Minimize layers, Transferring/repositioning devices

## 2019-11-10 NOTE — PROGRESS NOTES
responded to Death of  Anahy Mar, who was a 62 y.o.,male, The  provided the following Interventions: 
Provided crisis pastoral care, pastoral support and grief interventions. Offered prayers on behalf of the patient. Chart reviewed. Plan: 
Chaplains will continue to follow and will provide pastoral care on an as needed/requested basis and grief support for the family. [address] [place] [address] Care  
[phone]

## 2019-11-11 LAB
ABO + RH BLD: NORMAL
BLD PROD TYP BPU: NORMAL
BLOOD GROUP ANTIBODIES SERPL: NORMAL
BPU ID: NORMAL
CALLED TO:,BCALL1: NORMAL
CALLED TO:,BCALL1: NORMAL
CALLED TO:,BCALL2: NORMAL
CROSSMATCH RESULT,%XM: NORMAL
SPECIMEN EXP DATE BLD: NORMAL
STATUS OF UNIT,%ST: NORMAL
UNIT DIVISION, %UDIV: 0

## 2019-11-14 LAB
BACTERIA SPEC CULT: NORMAL
BACTERIA SPEC CULT: NORMAL
SERVICE CMNT-IMP: NORMAL
SERVICE CMNT-IMP: NORMAL
